# Patient Record
Sex: FEMALE | Race: WHITE | Employment: OTHER | URBAN - METROPOLITAN AREA
[De-identification: names, ages, dates, MRNs, and addresses within clinical notes are randomized per-mention and may not be internally consistent; named-entity substitution may affect disease eponyms.]

---

## 2018-07-07 ENCOUNTER — HOSPITAL ENCOUNTER (INPATIENT)
Age: 73
LOS: 5 days | Discharge: NURSING, REHAB OR LONG-TERM CARE | DRG: 291 | End: 2018-07-13
Attending: EMERGENCY MEDICINE | Admitting: INTERNAL MEDICINE
Payer: COMMERCIAL

## 2018-07-07 DIAGNOSIS — N17.9 ACUTE RENAL FAILURE, UNSPECIFIED ACUTE RENAL FAILURE TYPE (HCC): Primary | ICD-10-CM

## 2018-07-07 DIAGNOSIS — R53.1 GENERAL WEAKNESS: ICD-10-CM

## 2018-07-07 DIAGNOSIS — I50.20 SYSTOLIC CONGESTIVE HEART FAILURE, UNSPECIFIED CONGESTIVE HEART FAILURE CHRONICITY: ICD-10-CM

## 2018-07-07 DIAGNOSIS — R94.31 ABNORMAL EKG: ICD-10-CM

## 2018-07-07 PROCEDURE — 99285 EMERGENCY DEPT VISIT HI MDM: CPT

## 2018-07-08 ENCOUNTER — APPOINTMENT (OUTPATIENT)
Dept: ULTRASOUND IMAGING | Age: 73
DRG: 291 | End: 2018-07-08
Payer: COMMERCIAL

## 2018-07-08 ENCOUNTER — APPOINTMENT (OUTPATIENT)
Dept: CT IMAGING | Age: 73
DRG: 291 | End: 2018-07-08
Payer: COMMERCIAL

## 2018-07-08 ENCOUNTER — APPOINTMENT (OUTPATIENT)
Dept: GENERAL RADIOLOGY | Age: 73
DRG: 291 | End: 2018-07-08
Payer: COMMERCIAL

## 2018-07-08 PROBLEM — N17.9 ACUTE RENAL FAILURE (ARF) (HCC): Status: ACTIVE | Noted: 2018-07-08

## 2018-07-08 PROBLEM — R94.31 ABNORMAL EKG: Status: ACTIVE | Noted: 2018-07-08

## 2018-07-08 PROBLEM — I50.20 SYSTOLIC CONGESTIVE HEART FAILURE (HCC): Status: ACTIVE | Noted: 2018-07-08

## 2018-07-08 PROBLEM — R53.1 GENERAL WEAKNESS: Status: ACTIVE | Noted: 2018-07-08

## 2018-07-08 LAB
ALBUMIN SERPL-MCNC: 3 G/DL (ref 3.5–5.2)
ALP BLD-CCNC: 86 U/L (ref 35–104)
ALT SERPL-CCNC: 7 U/L (ref 0–32)
AMYLASE: 32 U/L (ref 20–100)
ANION GAP SERPL CALCULATED.3IONS-SCNC: 22 MMOL/L (ref 7–16)
ANION GAP SERPL CALCULATED.3IONS-SCNC: 26 MMOL/L (ref 7–16)
APTT: 30.3 SEC (ref 24.5–35.1)
AST SERPL-CCNC: 14 U/L (ref 0–31)
BACTERIA: ABNORMAL /HPF
BILIRUB SERPL-MCNC: 0.3 MG/DL (ref 0–1.2)
BILIRUBIN DIRECT: <0.2 MG/DL (ref 0–0.3)
BILIRUBIN URINE: NEGATIVE
BILIRUBIN, INDIRECT: ABNORMAL MG/DL (ref 0–1)
BLOOD, URINE: ABNORMAL
BUN BLDV-MCNC: 124 MG/DL (ref 8–23)
BUN BLDV-MCNC: 130 MG/DL (ref 8–23)
CALCIUM SERPL-MCNC: 7.5 MG/DL (ref 8.6–10.2)
CALCIUM SERPL-MCNC: 7.6 MG/DL (ref 8.6–10.2)
CHLORIDE BLD-SCNC: 100 MMOL/L (ref 98–107)
CHLORIDE BLD-SCNC: 99 MMOL/L (ref 98–107)
CHLORIDE URINE RANDOM: 48 MMOL/L
CK MB: 2.5 NG/ML (ref 0–4.3)
CLARITY: ABNORMAL
CO2: 15 MMOL/L (ref 22–29)
CO2: 17 MMOL/L (ref 22–29)
COLOR: ABNORMAL
CREAT SERPL-MCNC: 7.9 MG/DL (ref 0.5–1)
CREAT SERPL-MCNC: 8.2 MG/DL (ref 0.5–1)
CREATININE URINE: 161 MG/DL (ref 29–226)
D DIMER: 368 NG/ML DDU
EKG ATRIAL RATE: 66 BPM
EKG P AXIS: 52 DEGREES
EKG P-R INTERVAL: 164 MS
EKG Q-T INTERVAL: 388 MS
EKG QRS DURATION: 76 MS
EKG QTC CALCULATION (BAZETT): 406 MS
EKG R AXIS: 88 DEGREES
EKG T AXIS: 162 DEGREES
EKG VENTRICULAR RATE: 66 BPM
EOSINOPHIL, URINE: 0 % (ref 0–1)
EPITHELIAL CELLS, UA: ABNORMAL /HPF
GFR AFRICAN AMERICAN: 6
GFR AFRICAN AMERICAN: 6
GFR NON-AFRICAN AMERICAN: 5 ML/MIN/1.73
GFR NON-AFRICAN AMERICAN: 5 ML/MIN/1.73
GLUCOSE BLD-MCNC: 191 MG/DL (ref 74–109)
GLUCOSE BLD-MCNC: 194 MG/DL (ref 74–109)
GLUCOSE URINE: NEGATIVE MG/DL
HBA1C MFR BLD: 7.9 % (ref 4–5.6)
HCT VFR BLD CALC: 33 % (ref 34–48)
HEMOGLOBIN: 9.8 G/DL (ref 11.5–15.5)
INR BLD: 1.1
KETONES, URINE: NEGATIVE MG/DL
LACTIC ACID: 1.3 MMOL/L (ref 0.5–2.2)
LEUKOCYTE ESTERASE, URINE: ABNORMAL
LIPASE: 67 U/L (ref 13–60)
MAGNESIUM: 2.6 MG/DL (ref 1.6–2.6)
MCH RBC QN AUTO: 24.9 PG (ref 26–35)
MCHC RBC AUTO-ENTMCNC: 29.7 % (ref 32–34.5)
MCV RBC AUTO: 83.8 FL (ref 80–99.9)
METER GLUCOSE: 101 MG/DL (ref 70–110)
METER GLUCOSE: 146 MG/DL (ref 70–110)
METER GLUCOSE: 180 MG/DL (ref 70–110)
METER GLUCOSE: 65 MG/DL (ref 70–110)
NITRITE, URINE: NEGATIVE
PDW BLD-RTO: 17.5 FL (ref 11.5–15)
PH UA: 7.5 (ref 5–9)
PLATELET # BLD: 121 E9/L (ref 130–450)
PMV BLD AUTO: 13.1 FL (ref 7–12)
POTASSIUM SERPL-SCNC: 4 MMOL/L (ref 3.5–5)
POTASSIUM SERPL-SCNC: 4.6 MMOL/L (ref 3.5–5)
POTASSIUM, UR: 20 MMOL/L
PRO-BNP: ABNORMAL PG/ML (ref 0–450)
PROTEIN UA: 100 MG/DL
PROTHROMBIN TIME: 12.5 SEC (ref 9.3–12.4)
RBC # BLD: 3.94 E12/L (ref 3.5–5.5)
RBC UA: >20 /HPF (ref 0–2)
SODIUM BLD-SCNC: 139 MMOL/L (ref 132–146)
SODIUM BLD-SCNC: 140 MMOL/L (ref 132–146)
SODIUM URINE: 60 MMOL/L
SPECIFIC GRAVITY UA: 1.02 (ref 1–1.03)
T3 UPTAKE PERCENT: 36.7 % (ref 22.5–37)
T4 FREE: 0.79 NG/DL (ref 0.93–1.7)
TOTAL CK: 43 U/L (ref 20–180)
TOTAL PROTEIN: 6.6 G/DL (ref 6.4–8.3)
TROPONIN: 0.05 NG/ML (ref 0–0.03)
TSH SERPL DL<=0.05 MIU/L-ACNC: 6.2 UIU/ML (ref 0.27–4.2)
UROBILINOGEN, URINE: 0.2 E.U./DL
WBC # BLD: 6.2 E9/L (ref 4.5–11.5)
WBC UA: >20 /HPF (ref 0–5)

## 2018-07-08 PROCEDURE — 85378 FIBRIN DEGRADE SEMIQUANT: CPT

## 2018-07-08 PROCEDURE — 87186 SC STD MICRODIL/AGAR DIL: CPT

## 2018-07-08 PROCEDURE — 36415 COLL VENOUS BLD VENIPUNCTURE: CPT

## 2018-07-08 PROCEDURE — 93005 ELECTROCARDIOGRAM TRACING: CPT | Performed by: EMERGENCY MEDICINE

## 2018-07-08 PROCEDURE — 82436 ASSAY OF URINE CHLORIDE: CPT

## 2018-07-08 PROCEDURE — 87149 DNA/RNA DIRECT PROBE: CPT

## 2018-07-08 PROCEDURE — 82962 GLUCOSE BLOOD TEST: CPT

## 2018-07-08 PROCEDURE — 84443 ASSAY THYROID STIM HORMONE: CPT

## 2018-07-08 PROCEDURE — 83690 ASSAY OF LIPASE: CPT

## 2018-07-08 PROCEDURE — 84300 ASSAY OF URINE SODIUM: CPT

## 2018-07-08 PROCEDURE — 94640 AIRWAY INHALATION TREATMENT: CPT

## 2018-07-08 PROCEDURE — 72131 CT LUMBAR SPINE W/O DYE: CPT

## 2018-07-08 PROCEDURE — 70450 CT HEAD/BRAIN W/O DYE: CPT

## 2018-07-08 PROCEDURE — 83735 ASSAY OF MAGNESIUM: CPT

## 2018-07-08 PROCEDURE — 83605 ASSAY OF LACTIC ACID: CPT

## 2018-07-08 PROCEDURE — 82550 ASSAY OF CK (CPK): CPT

## 2018-07-08 PROCEDURE — 84439 ASSAY OF FREE THYROXINE: CPT

## 2018-07-08 PROCEDURE — 6370000000 HC RX 637 (ALT 250 FOR IP): Performed by: INTERNAL MEDICINE

## 2018-07-08 PROCEDURE — 84133 ASSAY OF URINE POTASSIUM: CPT

## 2018-07-08 PROCEDURE — 87205 SMEAR GRAM STAIN: CPT

## 2018-07-08 PROCEDURE — 82150 ASSAY OF AMYLASE: CPT

## 2018-07-08 PROCEDURE — 82553 CREATINE MB FRACTION: CPT

## 2018-07-08 PROCEDURE — 76775 US EXAM ABDO BACK WALL LIM: CPT

## 2018-07-08 PROCEDURE — 85730 THROMBOPLASTIN TIME PARTIAL: CPT

## 2018-07-08 PROCEDURE — 1200000000 HC SEMI PRIVATE

## 2018-07-08 PROCEDURE — 81001 URINALYSIS AUTO W/SCOPE: CPT

## 2018-07-08 PROCEDURE — 71045 X-RAY EXAM CHEST 1 VIEW: CPT

## 2018-07-08 PROCEDURE — 80048 BASIC METABOLIC PNL TOTAL CA: CPT

## 2018-07-08 PROCEDURE — 80076 HEPATIC FUNCTION PANEL: CPT

## 2018-07-08 PROCEDURE — 83036 HEMOGLOBIN GLYCOSYLATED A1C: CPT

## 2018-07-08 PROCEDURE — 84484 ASSAY OF TROPONIN QUANT: CPT

## 2018-07-08 PROCEDURE — 2500000003 HC RX 250 WO HCPCS: Performed by: INTERNAL MEDICINE

## 2018-07-08 PROCEDURE — 87040 BLOOD CULTURE FOR BACTERIA: CPT

## 2018-07-08 PROCEDURE — 87077 CULTURE AEROBIC IDENTIFY: CPT

## 2018-07-08 PROCEDURE — 6360000002 HC RX W HCPCS: Performed by: INTERNAL MEDICINE

## 2018-07-08 PROCEDURE — 2580000003 HC RX 258: Performed by: INTERNAL MEDICINE

## 2018-07-08 PROCEDURE — 87088 URINE BACTERIA CULTURE: CPT

## 2018-07-08 PROCEDURE — 85027 COMPLETE CBC AUTOMATED: CPT

## 2018-07-08 PROCEDURE — 83880 ASSAY OF NATRIURETIC PEPTIDE: CPT

## 2018-07-08 PROCEDURE — 85610 PROTHROMBIN TIME: CPT

## 2018-07-08 PROCEDURE — 82570 ASSAY OF URINE CREATININE: CPT

## 2018-07-08 PROCEDURE — 72125 CT NECK SPINE W/O DYE: CPT

## 2018-07-08 RX ORDER — NICOTINE POLACRILEX 4 MG
15 LOZENGE BUCCAL PRN
Status: DISCONTINUED | OUTPATIENT
Start: 2018-07-08 | End: 2018-07-13 | Stop reason: HOSPADM

## 2018-07-08 RX ORDER — INSULIN GLARGINE 100 [IU]/ML
25 INJECTION, SOLUTION SUBCUTANEOUS NIGHTLY
Status: DISCONTINUED | OUTPATIENT
Start: 2018-07-08 | End: 2018-07-13 | Stop reason: HOSPADM

## 2018-07-08 RX ORDER — CARVEDILOL 6.25 MG/1
12.5 TABLET ORAL 2 TIMES DAILY WITH MEALS
Status: DISCONTINUED | OUTPATIENT
Start: 2018-07-08 | End: 2018-07-09 | Stop reason: DRUGHIGH

## 2018-07-08 RX ORDER — HYDRALAZINE HYDROCHLORIDE 25 MG/1
25 TABLET, FILM COATED ORAL EVERY 8 HOURS SCHEDULED
Status: DISCONTINUED | OUTPATIENT
Start: 2018-07-08 | End: 2018-07-09

## 2018-07-08 RX ORDER — CALCITRIOL 0.25 UG/1
0.25 CAPSULE, LIQUID FILLED ORAL
Status: DISCONTINUED | OUTPATIENT
Start: 2018-07-08 | End: 2018-07-13 | Stop reason: HOSPADM

## 2018-07-08 RX ORDER — ASPIRIN 81 MG/1
81 TABLET, CHEWABLE ORAL DAILY
Status: DISCONTINUED | OUTPATIENT
Start: 2018-07-08 | End: 2018-07-13 | Stop reason: HOSPADM

## 2018-07-08 RX ORDER — BUMETANIDE 0.25 MG/ML
1 INJECTION, SOLUTION INTRAMUSCULAR; INTRAVENOUS 2 TIMES DAILY
Status: DISCONTINUED | OUTPATIENT
Start: 2018-07-08 | End: 2018-07-09 | Stop reason: DRUGHIGH

## 2018-07-08 RX ORDER — HEPARIN SODIUM 5000 [USP'U]/ML
5000 INJECTION, SOLUTION INTRAVENOUS; SUBCUTANEOUS EVERY 8 HOURS SCHEDULED
Status: DISCONTINUED | OUTPATIENT
Start: 2018-07-08 | End: 2018-07-11

## 2018-07-08 RX ORDER — SODIUM CHLORIDE 0.9 % (FLUSH) 0.9 %
10 SYRINGE (ML) INJECTION EVERY 12 HOURS SCHEDULED
Status: DISCONTINUED | OUTPATIENT
Start: 2018-07-08 | End: 2018-07-13 | Stop reason: HOSPADM

## 2018-07-08 RX ORDER — ACETAMINOPHEN 325 MG/1
650 TABLET ORAL EVERY 4 HOURS PRN
Status: DISCONTINUED | OUTPATIENT
Start: 2018-07-08 | End: 2018-07-13 | Stop reason: HOSPADM

## 2018-07-08 RX ORDER — IPRATROPIUM BROMIDE AND ALBUTEROL SULFATE 2.5; .5 MG/3ML; MG/3ML
1 SOLUTION RESPIRATORY (INHALATION)
Status: DISCONTINUED | OUTPATIENT
Start: 2018-07-08 | End: 2018-07-13 | Stop reason: HOSPADM

## 2018-07-08 RX ORDER — ONDANSETRON 2 MG/ML
4 INJECTION INTRAMUSCULAR; INTRAVENOUS EVERY 6 HOURS PRN
Status: DISCONTINUED | OUTPATIENT
Start: 2018-07-08 | End: 2018-07-13 | Stop reason: HOSPADM

## 2018-07-08 RX ORDER — SODIUM CHLORIDE 0.9 % (FLUSH) 0.9 %
10 SYRINGE (ML) INJECTION PRN
Status: DISCONTINUED | OUTPATIENT
Start: 2018-07-08 | End: 2018-07-13 | Stop reason: HOSPADM

## 2018-07-08 RX ORDER — DEXTROSE MONOHYDRATE 50 MG/ML
100 INJECTION, SOLUTION INTRAVENOUS PRN
Status: DISCONTINUED | OUTPATIENT
Start: 2018-07-08 | End: 2018-07-13 | Stop reason: HOSPADM

## 2018-07-08 RX ORDER — LEVOTHYROXINE SODIUM 0.1 MG/1
100 TABLET ORAL DAILY
Status: DISCONTINUED | OUTPATIENT
Start: 2018-07-08 | End: 2018-07-13 | Stop reason: HOSPADM

## 2018-07-08 RX ORDER — DEXTROSE MONOHYDRATE 25 G/50ML
12.5 INJECTION, SOLUTION INTRAVENOUS PRN
Status: DISCONTINUED | OUTPATIENT
Start: 2018-07-08 | End: 2018-07-13 | Stop reason: HOSPADM

## 2018-07-08 RX ADMIN — Medication 10 ML: at 21:00

## 2018-07-08 RX ADMIN — BUMETANIDE 1 MG: 0.25 INJECTION INTRAMUSCULAR; INTRAVENOUS at 09:55

## 2018-07-08 RX ADMIN — HEPARIN SODIUM 5000 UNITS: 5000 INJECTION, SOLUTION INTRAVENOUS; SUBCUTANEOUS at 15:10

## 2018-07-08 RX ADMIN — Medication 10 ML: at 09:56

## 2018-07-08 RX ADMIN — INSULIN LISPRO 1 UNITS: 100 INJECTION, SOLUTION INTRAVENOUS; SUBCUTANEOUS at 10:04

## 2018-07-08 RX ADMIN — ASPIRIN 81 MG 81 MG: 81 TABLET ORAL at 09:54

## 2018-07-08 RX ADMIN — CEFTRIAXONE 1 G: 1 INJECTION, POWDER, FOR SOLUTION INTRAMUSCULAR; INTRAVENOUS at 07:53

## 2018-07-08 RX ADMIN — IPRATROPIUM BROMIDE AND ALBUTEROL SULFATE 1 AMPULE: .5; 3 SOLUTION RESPIRATORY (INHALATION) at 10:46

## 2018-07-08 RX ADMIN — HEPARIN SODIUM 5000 UNITS: 5000 INJECTION, SOLUTION INTRAVENOUS; SUBCUTANEOUS at 23:05

## 2018-07-08 RX ADMIN — INSULIN LISPRO 15 UNITS: 100 INJECTION, SOLUTION INTRAVENOUS; SUBCUTANEOUS at 10:02

## 2018-07-08 RX ADMIN — HEPARIN SODIUM 5000 UNITS: 5000 INJECTION, SOLUTION INTRAVENOUS; SUBCUTANEOUS at 07:50

## 2018-07-08 RX ADMIN — CARVEDILOL 12.5 MG: 6.25 TABLET, FILM COATED ORAL at 09:54

## 2018-07-08 RX ADMIN — IPRATROPIUM BROMIDE AND ALBUTEROL SULFATE 1 AMPULE: .5; 3 SOLUTION RESPIRATORY (INHALATION) at 13:51

## 2018-07-08 RX ADMIN — CALCITRIOL 0.25 MCG: 0.25 CAPSULE, LIQUID FILLED ORAL at 09:56

## 2018-07-08 RX ADMIN — LEVOTHYROXINE SODIUM 100 MCG: 100 TABLET ORAL at 07:51

## 2018-07-08 ASSESSMENT — ENCOUNTER SYMPTOMS
WHEEZING: 0
SORE THROAT: 0
DIARRHEA: 0
SINUS PRESSURE: 0
ABDOMINAL PAIN: 0
EYE DISCHARGE: 0
SHORTNESS OF BREATH: 0
COUGH: 0
HEMATOCHEZIA: 0
VISUAL CHANGE: 0
ABDOMINAL DISTENTION: 0
VOMITING: 0
EYE REDNESS: 0
NAUSEA: 0
BACK PAIN: 0
EYE PAIN: 0

## 2018-07-08 NOTE — ED PROVIDER NOTES
The history is provided by the patient. No  was used. Fatigue   Timing:  Constant  Progression:  Worsening  Chronicity:  New  Context: not alcohol use, not allergies, not change in medication, not decreased sleep, not dehydration, not drug use, not increased activity, not pinched nerve, not recent infection, not stress and not urinary tract infection    Relieved by:  Nothing  Worsened by:  Nothing  Ineffective treatments:  None tried  Associated symptoms: difficulty walking and lethargy    Associated symptoms: no abdominal pain, no anorexia, no aphasia, no arthralgias, no ataxia, no chest pain, no cough, no diarrhea, no dizziness, no drooling, no dysphagia, no dysuria, no fever, no foul-smelling urine, no frequency, no headaches, no hematochezia, no loss of consciousness, no melena, no nausea, no seizures, no sensory-motor deficit, no shortness of breath, no stroke symptoms, no syncope, no urgency, no vision change and no vomiting    Risk factors: no anemia        Review of Systems   Constitutional: Positive for fatigue. Negative for chills and fever. HENT: Negative for drooling, ear pain, sinus pressure and sore throat. Eyes: Negative for pain, discharge and redness. Respiratory: Negative for cough, shortness of breath and wheezing. Cardiovascular: Negative for chest pain and syncope. Gastrointestinal: Negative for abdominal distention, abdominal pain, anorexia, diarrhea, dysphagia, hematochezia, melena, nausea and vomiting. Genitourinary: Negative for dysuria, frequency and urgency. Musculoskeletal: Negative for arthralgias and back pain. Skin: Negative for rash and wound. Neurological: Negative for dizziness, seizures, loss of consciousness, weakness and headaches. Hematological: Negative for adenopathy. All other systems reviewed and are negative. Physical Exam   Constitutional: She is oriented to person, place, and time.  She appears well-developed and (2011); ECHO Compl W Dop Color Flow (3/28/2012 EF 42%); and Dialysis fistula creation (april 2012). Social History:  reports that she quit smoking about 26 years ago. She has a 60.00 pack-year smoking history. She has never used smokeless tobacco. She reports that she does not drink alcohol or use drugs. Family History: family history includes Arthritis in her mother; Cancer in her mother; Diabetes in her mother and sister; Zeina Seton / [place] in her mother. The patients home medications have been reviewed.     Allergies: Ace inhibitors and Angiotensin receptor blockers    -------------------------------------------------- RESULTS -------------------------------------------------    LABS:  Results for orders placed or performed during the hospital encounter of 68/82/69   Basic metabolic panel   Result Value Ref Range    Sodium 140 132 - 146 mmol/L    Potassium 4.6 3.5 - 5.0 mmol/L    Chloride 99 98 - 107 mmol/L    CO2 15 (L) 22 - 29 mmol/L    Anion Gap 26 (H) 7 - 16 mmol/L    Glucose 194 (H) 74 - 109 mg/dL     (H) 8 - 23 mg/dL    CREATININE 7.9 (H) 0.5 - 1.0 mg/dL    GFR Non-African American 5 >=60 mL/min/1.73    GFR African American 6     Calcium 7.6 (L) 8.6 - 10.2 mg/dL   CBC   Result Value Ref Range    WBC 6.2 4.5 - 11.5 E9/L    RBC 3.94 3.50 - 5.50 E12/L    Hemoglobin 9.8 (L) 11.5 - 15.5 g/dL    Hematocrit 33.0 (L) 34.0 - 48.0 %    MCV 83.8 80.0 - 99.9 fL    MCH 24.9 (L) 26.0 - 35.0 pg    MCHC 29.7 (L) 32.0 - 34.5 %    RDW 17.5 (H) 11.5 - 15.0 fL    Platelets 718 (L) 193 - 450 E9/L    MPV 13.1 (H) 7.0 - 12.0 fL   Troponin   Result Value Ref Range    Troponin 0.05 (H) 0.00 - 0.03 ng/mL   CK   Result Value Ref Range    Total CK 43 20 - 180 U/L   CK MB   Result Value Ref Range    CK-MB 2.5 0.0 - 4.3 ng/mL   Brain Natriuretic Peptide   Result Value Ref Range    Pro-BNP >70,000 (H) 0 - 450 pg/mL   Amylase   Result Value Ref Range    Amylase 32 20 - 100 U/L   Lipase   Result Value Ref Range

## 2018-07-08 NOTE — H&P
CARDIAC CATHETERIZATION  03/08/2012    Cath, Dr. Bishop Cisneros, No Intervention    CHOLECYSTECTOMY      DIALYSIS FISTULA CREATION  april 2012    creation right arm a/v fistula    ECHO COMPL W DOP COLOR FLOW  3/3/2012 EF 55%         ECHO COMPL W DOP COLOR FLOW  3/28/2012 EF 42%         ECHO LIMITED/FOLLOW UP  3/9/2012         FRACTURE SURGERY      SHOULDER ARTHROPLASTY  2011       FAMILY Hx:  Family History   Problem Relation Age of Onset    Arthritis Mother     Cancer Mother     Diabetes Mother    [de-identified] / Stillbirths Mother     Diabetes Sister        HOME MEDICATIONS:  Prior to Admission medications    Medication Sig Start Date End Date Taking? Authorizing Provider   aspirin 81 MG tablet Take 81 mg by mouth daily. Yes Historical Provider, MD   bumetanide (BUMEX) 2 MG tablet Take 2 mg by mouth daily (with breakfast). Yes Historical Provider, MD   carvedilol (COREG) 25 MG tablet Take 12.5 mg by mouth 2 times daily (with meals). pcp Dr Joya Hong will call doses/names of meds to [facility] branch" delivery system 5/8/14  Yes Historical Provider, MD   calcitRIOL (ROCALTROL) 0.25 MCG capsule Take 1 capsule by mouth four times a week. 8/23/12  Yes Willard Hwang,    levothyroxine (SYNTHROID) 100 MCG tablet Take 1 tablet by mouth Daily. 8/22/12  Yes Willard Hwang DO   hydrALAZINE (APRESOLINE) 25 MG tablet Take 1 tablet by mouth every 8 hours. 3/31/12  Yes Dillon Valenzuela,    insulin aspart (NOVOLOG) 100 UNIT/ML injection Inject 15 Units into the skin 3 times daily (before meals). Yes Historical Provider, MD   insulin glargine (LANTUS) 100 UNIT/ML injection Inject 25 Units into the skin nightly. Yes Historical Provider, MD   vitamin D (ERGOCALCIFEROL) 54950 UNITS CAPS capsule Take 50,000 Units by mouth once a week. Taken on Wednesdays. Historical Provider, MD   sevelamer (RENVELA) 800 MG tablet Take 2 tablets by mouth 3 times daily (with meals).  8/22/12   Willard Hwang DO Tonsils without erythema or exudates. NECK:    Supple, symmetrical, trachea midline, no adenopathy, thyroid symmetric, not enlarged and no tenderness, skin normal, no bruits, no JVD    HEMATOLOGIC/LYMPHATICS:    No cervical lymphadenopathy and no supraclavicular lymphadenopathy    LUNGS:    Symmetric. No increased work of breathing, good air exchange, clear to auscultation bilaterally, no wheezes, rhonchi, or rales,     CARDIOVASCULAR:    Normal apical impulse, regular rate and rhythm, normal S1 and S2, no S3 or S4, and no murmur noted    ABDOMEN:    No scars, normal bowel sounds, soft, non-distended, non-tender, no masses palpated, no hepatosplenomegaly, no rebound or guarding elicited on palpation     MUSCULOSKELETAL:    There is no redness, warmth, or swelling of the joints. Full range of motion noted. Motor strength is 5 out of 5 all extremities bilaterally. Tone is normal.    NEUROLOGIC:    Awake, alert, oriented to name, place and time. Cranial nerves II-XII are grossly intact. Motor is 5 out of 5 bilaterally. SKIN:    No bruising or bleeding. No redness, warmth, or swelling    EXTREMITIES:    Bilateral lower extremity swelling and edema. OSTEOPATHIC:    Examined in seated and supine positions. Normal thoracic kyphosis and lumbar lordosis. No acute somatic dysfunction.     LABORATORY DATA:  CBC with Differential:    Lab Results   Component Value Date    WBC 6.2 07/08/2018    RBC 3.94 07/08/2018    HGB 9.8 07/08/2018    HCT 33.0 07/08/2018     07/08/2018    MCV 83.8 07/08/2018    MCH 24.9 07/08/2018    MCHC 29.7 07/08/2018    RDW 17.5 07/08/2018    SEGSPCT 58 03/06/2014    LYMPHOPCT 19 04/25/2014    MONOPCT 10 04/25/2014    BASOPCT 0 04/25/2014    MONOSABS 0.95 04/25/2014    LYMPHSABS 1.84 04/25/2014    EOSABS 0.50 04/25/2014    BASOSABS 0.03 04/25/2014     BMP:    Lab Results   Component Value Date     07/08/2018    K 4.6 07/08/2018    CL 99 07/08/2018    CO2 15 07/08/2018    BUN 130 07/08/2018    LABALBU 3.0 07/08/2018    LABALBU 3.6 05/09/2012    CREATININE 7.9 07/08/2018    CALCIUM 7.6 07/08/2018    GFRAA 6 07/08/2018    LABGLOM 5 07/08/2018    GLUCOSE 194 07/08/2018    GLUCOSE 122 06/04/2012       ASSESSMENT:  1. Acutely decompensated systolic congestive heart failure with marked volume overload  2. Acute on chronic kidney disease stage III with baseline creatinine of approximately 3.0  3. Generalized deconditioning with inability to complete activities of daily living  4. Essential hypertension  5. Hyperlipidemia  6. Hypothyroidism  7. Coronary artery disease    PLAN:  The patient has obviously become very weak and deconditioned. IV diuresis will be undertaken and we will monitor strict I's and O's as well as daily weights. The patient follows with Dr. Juanis Marie as an outpatient and he will be asked to provide consultation. We discussed the need for fluid restriction as an outpatient. The patient's creatinine is markedly deviated from her baseline as well. She follows with Dr. Bobby Bean longitudinally and he will be asked to provide consultation. We will assess the patient's response to diuresis. Chronic comorbidities will be monitored accordingly. She requires aggressive work with the therapy teams. Family was updated at the bedside. Care will return to Dr. Judith Dozier tomorrow.     Jodee Cm D.O., FACOI  12:11 PM  7/8/2018    Electronically signed by Jodee Cm DO on 7/8/18 at 12:11 PM

## 2018-07-08 NOTE — CONSULTS
smokeless tobacco. She reports that she does not drink alcohol or use drugs. Family History:  family history includes Arthritis in her mother; Cancer in her mother; Diabetes in her mother and sister; Jarett Webster / [place] in her mother. Medications:  Prior to Admission medications    Medication Sig Start Date End Date Taking? Authorizing Provider   aspirin 81 MG tablet Take 81 mg by mouth daily. Yes Historical Provider, MD   bumetanide (BUMEX) 2 MG tablet Take 2 mg by mouth daily (with breakfast). Yes Historical Provider, MD   carvedilol (COREG) 25 MG tablet Take 12.5 mg by mouth 2 times daily (with meals). pcp Dr Darren Tanner will call doses/names of meds to [facility] for [facility] branch" delivery system 5/8/14  Yes Historical Provider, MD   calcitRIOL (ROCALTROL) 0.25 MCG capsule Take 1 capsule by mouth four times a week. 8/23/12  Yes Lenka Foley DO   levothyroxine (SYNTHROID) 100 MCG tablet Take 1 tablet by mouth Daily. 8/22/12  Yes Lenka Foley DO   hydrALAZINE (APRESOLINE) 25 MG tablet Take 1 tablet by mouth every 8 hours. 3/31/12  Yes Dillon Valenzuela,    insulin aspart (NOVOLOG) 100 UNIT/ML injection Inject 15 Units into the skin 3 times daily (before meals). Yes Historical Provider, MD   insulin glargine (LANTUS) 100 UNIT/ML injection Inject 25 Units into the skin nightly. Yes Historical Provider, MD   vitamin D (ERGOCALCIFEROL) 04077 UNITS CAPS capsule Take 50,000 Units by mouth once a week. Taken on Wednesdays. Historical Provider, MD   sevelamer (RENVELA) 800 MG tablet Take 2 tablets by mouth 3 times daily (with meals). 8/22/12   Lenka Foley DO       Allergies:  Ace inhibitors and Angiotensin receptor blockers     Review of Systems:   · Constitutional: there has been no unanticipated weight loss. There's been no significant change in energy level, sleep pattern or activity level. No fever chills or rigors. · Eyes: No visual changes or diplopia.  No scleral icterus. · ENT: No Headaches, hearing loss or vertigo. No mouth sores or sore throat. No change in taste or smell. · Cardiovascular: No chest discomfort, but has dyspnea on exertion, denies palpitations, loss of consciousness, no phlebitis, no claudication. · Respiratory: No cough or wheezing, no sputum production. No hemoptysis, pleuritic pain. · Gastrointestinal: No abdominal pain, appetite loss, blood in stools. No change in bowel habits. No hematemesis  · Genitourinary: No dysuria, trouble voiding or hematuria. No nocturia or increased frequency. · Musculoskeletal:  Has gait disturbance, weakness but no specific joint complaints. · Integumentary: No rash or pruritis. · Neurological: No headache, diplopia, change in muscle strength, numbness or tingling. Has change in gait, balance, coordination, but no change in mood, affect, memory, mentation, behavior. · Psychiatric: No anxiety or depression. · Endocrine: No temperature intolerance. No excessive thirst, fluid intake, or urination. No tremor. · Hematologic/Lymphatic: No abnormal bruising or bleeding, blood clots or swollen lymph nodes. · Allergic/Immunologic: No nasal congestion or hives. Physical Examination:    Vital Signs: BP (!) 86/50   Pulse 56   Temp 97.4 °F (36.3 °C) (Oral)   Resp 16   Ht 5' 5\" (1.651 m)   Wt 271 lb (122.9 kg)   SpO2 96%   BMI 45.10 kg/m²   General appearance: Well preserved, mesomorphic body habitus, alert, no distress. Skin: Skin color, texture, turgor normal. No rashes or lesions. No induration or tightening palpated. Head: Normocephalic. No masses, lesions, tenderness or abnormalities  Eyes: Conjunctivae/corneas clear. PERRL, EOMs intact. Sclera non icteric. Ears: External ears normal. Canals clear. TM's clear bilaterally. Hearing normal to finger rub. Nose/Sinuses: Nares normal. Septum midline. Mucosa normal. No drainage or sinus tenderness.   Oropharynx: Lips, mucosa, and tongue normal. Oropharynx clear with no exudate seen. Neck: Neck supple and symmetric. No adenopathy. Thyroid symmetric, normal size, without nodules. Trachea is midline. Carotids brisk in upstroke without bruits, no abnormal JVP noted at 45°. Chest: Even excursion  Lungs: Lungs clear to auscultation bilaterally. No retractions or use of accessory muscles. No tactile vocal fremitus. No rhonchi, crackles or rales. Heart:  S1 > S2. Regular rhythm. No gallop or murmur. No rub, palpable thrill or heave noted. PMI 5th intercostal space midclavicular line. Abdomen: Abdomen soft, moderately protuberant, non-tender. BS normal. No masses, organomegaly. No hernia noted. Extremities: Extremities normal. No deformities, 2+ bilateral ankle and pretibial edema, or skin discoloration. No cyanosis or clubbing noted to the nails. Peripheral pulses present 2+ upper extremities and barely palpable  lower extremities. Musculoskeletal: Spine ROM normal. Muscular strength intact. Neuro: Cranial nerves intact. Motor: Strength 5/5 in all extremities. Reflexes 2+ in all extremities. No focal weakness. Sensory: grossly normal to touch. Coordination intact.     Pertinent Labs:  CBC:   Recent Labs      07/08/18   0015   WBC  6.2   HGB  9.8*   PLT  121*     BMP:  Recent Labs      07/08/18   0015   NA  140   K  4.6   CL  99   CO2  15*   BUN  130*   CREATININE  7.9*   GLUCOSE  194*   LABGLOM  5     ABGs:   Lab Results   Component Value Date    PO2 138.0 03/02/2012    PCO2 34.8 03/02/2012     INR:   Recent Labs      07/08/18   0118   INR  1.1     PRO-BNP:   Lab Results   Component Value Date    PROBNP >70,000 (H) 07/08/2018      Cardiac Injury Profile:   Recent Labs      07/08/18   0015   CKTOTAL  43   CKMB  2.5   TROPONINI  0.05*      Lipid Profile:   Lab Results   Component Value Date    TRIG 246 04/22/2014    HDL 29 04/22/2014    LDLCALC 55 04/22/2014    CHOL 133 04/22/2014      Hemoglobin A1C: No components found for: HGBA1C   ECG:  See report    Radiology:  Emelia Gallego Wo Contrast    Result Date: 7/8/2018  Location:100 Exam: CT HEAD WO CONTRAST Comparison: March 16, 2009 History:  Trauma from fall, pain Findings: A noncontrast spiral CT of the brain was performed. Coronal and sagittal reconstructions. Automated dose exposure control was utilized for this examination. Prominent ventricles and sulci with periventricular leukomalacia. No evidence for acute hemorrhage or infarction. No mass-effect midline shift or herniation. Age-appropriate atrophy, no acute infarct or hemorrhage. Ct Cervical Spine Wo Contrast    Result Date: 7/8/2018  Location:100 Exam: CT CERVICAL SPINE WO CONTRAST Comparison: None History:  Trauma from fall, pain Findings: Spiral CT of cervical spine without contrast. Coronal and sagittal reconstructions were obtained. No soft tissue swelling or hematoma identified. Cervical vertebrae maintain normal stature and alignment. No evidence of fracture or subluxation. There is no spinal or foraminal stenosis. Facets are normal alignment. C1-C2 articulation is normal. Spondylosis with severe spurring sclerosis C5-6. Severe facet arthropathy right C5-6 C6-7.     1. No fracture or subluxation. 2. Severe spondylosis and facet arthropathy C5-6. Facet arthropathy C6-7. Ct Lumbar Spine Wo Contrast    Result Date: 7/8/2018  Reading location: 100 Exam: CT LUMBAR SPINE WO CONTRAST Comparison: None History: Injury from fall, pain TECHNIQUE: Spiral CT of the lumbar spine. Coronal and sagittal reconstructions were obtained. Automated dose exposure control was utilized for this examination. Findings: The lumbar vertebral bodies maintain normal stature and alignment. No evidence of fracture or subluxation. No lytic or blastic destructive changes. At L4-5 there is vacuum disc phenomenon with bulging disc which along with facet ligamentum flavum hypertrophy resulting in mild to moderate spinal stenosis.  There is mild spinal stenosis at L3-4 as well secondary to the facet Grover Tesfaye, [facility]    NOTE:  This report was transcribed using voice recognition software. Every effort was made to ensure accuracy; however, inadvertent computerized transcription errors may be present.

## 2018-07-09 ENCOUNTER — APPOINTMENT (OUTPATIENT)
Dept: INTERVENTIONAL RADIOLOGY/VASCULAR | Age: 73
DRG: 291 | End: 2018-07-09
Payer: COMMERCIAL

## 2018-07-09 LAB
ALBUMIN SERPL-MCNC: 3 G/DL (ref 3.5–5.2)
ALP BLD-CCNC: 77 U/L (ref 35–104)
ALT SERPL-CCNC: 8 U/L (ref 0–32)
ANION GAP SERPL CALCULATED.3IONS-SCNC: 26 MMOL/L (ref 7–16)
APTT: 31.9 SEC (ref 24.5–35.1)
AST SERPL-CCNC: 20 U/L (ref 0–31)
BASOPHILS ABSOLUTE: 0 E9/L (ref 0–0.2)
BASOPHILS RELATIVE PERCENT: 0 % (ref 0–2)
BILIRUB SERPL-MCNC: 0.3 MG/DL (ref 0–1.2)
BUN BLDV-MCNC: 134 MG/DL (ref 8–23)
CALCIUM SERPL-MCNC: 8.1 MG/DL (ref 8.6–10.2)
CHLORIDE BLD-SCNC: 98 MMOL/L (ref 98–107)
CHOLESTEROL, TOTAL: 83 MG/DL (ref 0–199)
CO2: 15 MMOL/L (ref 22–29)
CREAT SERPL-MCNC: 8.3 MG/DL (ref 0.5–1)
CREATININE URINE: 199 MG/DL (ref 29–226)
EOSINOPHILS ABSOLUTE: 0.09 E9/L (ref 0.05–0.5)
EOSINOPHILS RELATIVE PERCENT: 2 % (ref 0–6)
GFR AFRICAN AMERICAN: 6
GFR NON-AFRICAN AMERICAN: 5 ML/MIN/1.73
GLUCOSE BLD-MCNC: 90 MG/DL (ref 74–109)
HCT VFR BLD CALC: 30.7 % (ref 34–48)
HCT VFR BLD CALC: 32.4 % (ref 34–48)
HDLC SERPL-MCNC: 42 MG/DL
HEMOGLOBIN: 8.8 G/DL (ref 11.5–15.5)
HEMOGLOBIN: 9.4 G/DL (ref 11.5–15.5)
INR BLD: 1.2
LDL CHOLESTEROL CALCULATED: 16 MG/DL (ref 0–99)
LV EF: 32 %
LVEF MODALITY: NORMAL
LYMPHOCYTES ABSOLUTE: 0.77 E9/L (ref 1.5–4)
LYMPHOCYTES RELATIVE PERCENT: 18 % (ref 20–42)
MAGNESIUM: 2.8 MG/DL (ref 1.6–2.6)
MCH RBC QN AUTO: 24.2 PG (ref 26–35)
MCH RBC QN AUTO: 24.4 PG (ref 26–35)
MCHC RBC AUTO-ENTMCNC: 28.7 % (ref 32–34.5)
MCHC RBC AUTO-ENTMCNC: 29 % (ref 32–34.5)
MCV RBC AUTO: 83.9 FL (ref 80–99.9)
MCV RBC AUTO: 84.6 FL (ref 80–99.9)
METER GLUCOSE: 104 MG/DL (ref 70–110)
METER GLUCOSE: 106 MG/DL (ref 70–110)
METER GLUCOSE: 116 MG/DL (ref 70–110)
METER GLUCOSE: 99 MG/DL (ref 70–110)
MONOCYTES ABSOLUTE: 0.26 E9/L (ref 0.1–0.95)
MONOCYTES RELATIVE PERCENT: 6 % (ref 2–12)
MYELOCYTE PERCENT: 1 % (ref 0–0)
NEUTROPHILS ABSOLUTE: 3.18 E9/L (ref 1.8–7.3)
NEUTROPHILS RELATIVE PERCENT: 73 % (ref 43–80)
NUCLEATED RED BLOOD CELLS: 1 /100 WBC
PDW BLD-RTO: 17.5 FL (ref 11.5–15)
PDW BLD-RTO: 17.6 FL (ref 11.5–15)
PHOSPHORUS: 8.6 MG/DL (ref 2.5–4.5)
PLATELET # BLD: 123 E9/L (ref 130–450)
PLATELET # BLD: 53 E9/L (ref 130–450)
PLATELET CONFIRMATION: NORMAL
PMV BLD AUTO: 12.3 FL (ref 7–12)
PMV BLD AUTO: ABNORMAL FL (ref 7–12)
POTASSIUM SERPL-SCNC: 5.4 MMOL/L (ref 3.5–5)
PROTEIN PROTEIN: 234 MG/DL (ref 0–12)
PROTHROMBIN TIME: 13.5 SEC (ref 9.3–12.4)
RBC # BLD: 3.63 E12/L (ref 3.5–5.5)
RBC # BLD: 3.86 E12/L (ref 3.5–5.5)
SODIUM BLD-SCNC: 139 MMOL/L (ref 132–146)
SODIUM URINE: 22 MMOL/L
TOTAL PROTEIN: 6.5 G/DL (ref 6.4–8.3)
TRIGL SERPL-MCNC: 126 MG/DL (ref 0–149)
TSH SERPL DL<=0.05 MIU/L-ACNC: 4.75 UIU/ML (ref 0.27–4.2)
VLDLC SERPL CALC-MCNC: 25 MG/DL
WBC # BLD: 4.3 E9/L (ref 4.5–11.5)
WBC # BLD: 5 E9/L (ref 4.5–11.5)

## 2018-07-09 PROCEDURE — 85025 COMPLETE CBC W/AUTO DIFF WBC: CPT

## 2018-07-09 PROCEDURE — 82962 GLUCOSE BLOOD TEST: CPT

## 2018-07-09 PROCEDURE — G8978 MOBILITY CURRENT STATUS: HCPCS | Performed by: PHYSICAL THERAPIST

## 2018-07-09 PROCEDURE — C1881 DIALYSIS ACCESS SYSTEM: HCPCS

## 2018-07-09 PROCEDURE — 84443 ASSAY THYROID STIM HORMONE: CPT

## 2018-07-09 PROCEDURE — 84300 ASSAY OF URINE SODIUM: CPT

## 2018-07-09 PROCEDURE — P9046 ALBUMIN (HUMAN), 25%, 20 ML: HCPCS

## 2018-07-09 PROCEDURE — 1200000000 HC SEMI PRIVATE

## 2018-07-09 PROCEDURE — 86706 HEP B SURFACE ANTIBODY: CPT

## 2018-07-09 PROCEDURE — 93306 TTE W/DOPPLER COMPLETE: CPT

## 2018-07-09 PROCEDURE — 85027 COMPLETE CBC AUTOMATED: CPT

## 2018-07-09 PROCEDURE — 84100 ASSAY OF PHOSPHORUS: CPT

## 2018-07-09 PROCEDURE — 6370000000 HC RX 637 (ALT 250 FOR IP): Performed by: NURSE PRACTITIONER

## 2018-07-09 PROCEDURE — 2580000003 HC RX 258

## 2018-07-09 PROCEDURE — 36558 INSERT TUNNELED CV CATH: CPT | Performed by: RADIOLOGY

## 2018-07-09 PROCEDURE — 97161 PT EVAL LOW COMPLEX 20 MIN: CPT | Performed by: PHYSICAL THERAPIST

## 2018-07-09 PROCEDURE — 97530 THERAPEUTIC ACTIVITIES: CPT | Performed by: PHYSICAL THERAPIST

## 2018-07-09 PROCEDURE — 94640 AIRWAY INHALATION TREATMENT: CPT

## 2018-07-09 PROCEDURE — 77001 FLUOROGUIDE FOR VEIN DEVICE: CPT | Performed by: RADIOLOGY

## 2018-07-09 PROCEDURE — 6360000002 HC RX W HCPCS: Performed by: INTERNAL MEDICINE

## 2018-07-09 PROCEDURE — 36415 COLL VENOUS BLD VENIPUNCTURE: CPT

## 2018-07-09 PROCEDURE — 6360000002 HC RX W HCPCS

## 2018-07-09 PROCEDURE — 76937 US GUIDE VASCULAR ACCESS: CPT | Performed by: RADIOLOGY

## 2018-07-09 PROCEDURE — 6370000000 HC RX 637 (ALT 250 FOR IP): Performed by: INTERNAL MEDICINE

## 2018-07-09 PROCEDURE — 80061 LIPID PANEL: CPT

## 2018-07-09 PROCEDURE — 83735 ASSAY OF MAGNESIUM: CPT

## 2018-07-09 PROCEDURE — 2580000003 HC RX 258: Performed by: INTERNAL MEDICINE

## 2018-07-09 PROCEDURE — 5A1D70Z PERFORMANCE OF URINARY FILTRATION, INTERMITTENT, LESS THAN 6 HOURS PER DAY: ICD-10-PCS

## 2018-07-09 PROCEDURE — G8979 MOBILITY GOAL STATUS: HCPCS | Performed by: PHYSICAL THERAPIST

## 2018-07-09 PROCEDURE — 82570 ASSAY OF URINE CREATININE: CPT

## 2018-07-09 PROCEDURE — 80053 COMPREHEN METABOLIC PANEL: CPT

## 2018-07-09 PROCEDURE — 02HV33Z INSERTION OF INFUSION DEVICE INTO SUPERIOR VENA CAVA, PERCUTANEOUS APPROACH: ICD-10-PCS | Performed by: RADIOLOGY

## 2018-07-09 PROCEDURE — 90935 HEMODIALYSIS ONE EVALUATION: CPT

## 2018-07-09 PROCEDURE — 85730 THROMBOPLASTIN TIME PARTIAL: CPT

## 2018-07-09 PROCEDURE — 84156 ASSAY OF PROTEIN URINE: CPT

## 2018-07-09 PROCEDURE — 85610 PROTHROMBIN TIME: CPT

## 2018-07-09 RX ORDER — CARVEDILOL 6.25 MG/1
6.25 TABLET ORAL 2 TIMES DAILY WITH MEALS
Status: DISCONTINUED | OUTPATIENT
Start: 2018-07-09 | End: 2018-07-09

## 2018-07-09 RX ORDER — SODIUM CHLORIDE 9 MG/ML
INJECTION, SOLUTION INTRAVENOUS CONTINUOUS
Status: DISCONTINUED | OUTPATIENT
Start: 2018-07-09 | End: 2018-07-10

## 2018-07-09 RX ORDER — MIDODRINE HYDROCHLORIDE 5 MG/1
5 TABLET ORAL
Status: DISCONTINUED | OUTPATIENT
Start: 2018-07-09 | End: 2018-07-13 | Stop reason: HOSPADM

## 2018-07-09 RX ORDER — ALBUMIN (HUMAN) 12.5 G/50ML
25 SOLUTION INTRAVENOUS ONCE
Status: COMPLETED | OUTPATIENT
Start: 2018-07-09 | End: 2018-07-09

## 2018-07-09 RX ADMIN — HEPARIN SODIUM 5000 UNITS: 5000 INJECTION, SOLUTION INTRAVENOUS; SUBCUTANEOUS at 06:01

## 2018-07-09 RX ADMIN — IPRATROPIUM BROMIDE AND ALBUTEROL SULFATE 1 AMPULE: .5; 3 SOLUTION RESPIRATORY (INHALATION) at 09:51

## 2018-07-09 RX ADMIN — IPRATROPIUM BROMIDE AND ALBUTEROL SULFATE 1 AMPULE: .5; 3 SOLUTION RESPIRATORY (INHALATION) at 06:18

## 2018-07-09 RX ADMIN — IPRATROPIUM BROMIDE AND ALBUTEROL SULFATE 1 AMPULE: .5; 3 SOLUTION RESPIRATORY (INHALATION) at 16:29

## 2018-07-09 RX ADMIN — ALBUMIN (HUMAN) 25 G: 25 SOLUTION INTRAVENOUS at 10:43

## 2018-07-09 RX ADMIN — LEVOTHYROXINE SODIUM 100 MCG: 100 TABLET ORAL at 06:02

## 2018-07-09 RX ADMIN — CEFTRIAXONE 1 G: 1 INJECTION, POWDER, FOR SOLUTION INTRAMUSCULAR; INTRAVENOUS at 06:02

## 2018-07-09 RX ADMIN — ACETAMINOPHEN 650 MG: 325 TABLET, FILM COATED ORAL at 14:58

## 2018-07-09 RX ADMIN — MIDODRINE HYDROCHLORIDE 5 MG: 5 TABLET ORAL at 19:02

## 2018-07-09 RX ADMIN — SODIUM CHLORIDE: 9 INJECTION, SOLUTION INTRAVENOUS at 11:50

## 2018-07-09 ASSESSMENT — PAIN DESCRIPTION - PROGRESSION: CLINICAL_PROGRESSION: NOT CHANGED

## 2018-07-09 ASSESSMENT — PAIN DESCRIPTION - DESCRIPTORS
DESCRIPTORS: ACHING;DISCOMFORT
DESCRIPTORS: ACHING;DISCOMFORT

## 2018-07-09 ASSESSMENT — PAIN DESCRIPTION - ONSET
ONSET: ON-GOING
ONSET: ON-GOING

## 2018-07-09 ASSESSMENT — PAIN DESCRIPTION - FREQUENCY
FREQUENCY: INTERMITTENT
FREQUENCY: INTERMITTENT

## 2018-07-09 ASSESSMENT — PAIN DESCRIPTION - PAIN TYPE
TYPE: CHRONIC PAIN
TYPE: CHRONIC PAIN

## 2018-07-09 ASSESSMENT — PAIN SCALES - GENERAL
PAINLEVEL_OUTOF10: 0
PAINLEVEL_OUTOF10: 9
PAINLEVEL_OUTOF10: 0
PAINLEVEL_OUTOF10: 4

## 2018-07-09 ASSESSMENT — PAIN DESCRIPTION - LOCATION
LOCATION: BACK
LOCATION: GENERALIZED

## 2018-07-09 ASSESSMENT — PAIN DESCRIPTION - ORIENTATION: ORIENTATION: LOWER

## 2018-07-09 NOTE — PROGRESS NOTES
Physical Therapy    [id number]    PHYSICAL THERAPY INITIAL EVALUATION  Tentative placement recommendation: subacute  Equipment prescriptions needed:   none  Plan of care: Patient will be seen  daily for therapeutic exercise, functional retraining, endurance activities, balance exercises, family and patient education. AM-PAC Basic Mobility       AM-[facility] Mobility Inpatient   How much difficulty turning over in bed?: A Lot  How much difficulty sitting down on / standing up from a chair with arms?: A Lot  How much difficulty moving from lying on back to sitting on side of bed?: A Lot  How much help from another person moving to and from a bed to a chair?: A Lot  How much help from another person needed to walk in hospital room?: Total  How much help from another person for climbing 3-5 steps with a railing?: Total  AM-PAC Inpatient Mobility Raw Score : 10  AM-PAC Inpatient T-Scale Score : 32.29  Mobility Inpatient CMS 0-100% Score: 76.75  Mobility Inpatient CMS G-Code Modifier : CL    Order: evaluate and treat  Diagnosis:    1. Acute renal failure, unspecified acute renal failure type ([address]    2. Systolic congestive heart failure, unspecified congestive heart failure chronicity ([address]    3. General weakness    4.  Abnormal EKG      Past medical history:       Diagnosis Date    Arthritis     CAD (coronary artery disease)     had heart attack    [facility]congestive [facility] failure[facility]) 4/20/14    Echo 4/21/14 EF 90% stage II diastolic    CKD (chronic kidney disease) stage 3, GFR 30-59 ml/min 3/2/2012    Depression, acute     Diabetes mellitus ([address]     History of cardiovascular stress test 05/2012    VIABILITY STUDY WITH LEXISCAN    History of cardiovascular stress test 08/17/2012    lexiscan nuclear stress    Hyperlipidemia     Hypertension     Hypothyroid 3/3/2012    Liver disease     Psychiatric problem     Systolic CHF, chronic ([address] 7/3/3047    Systolic heart failure ([address] 2012    3/6/2012-stress Ambulation-Minimal assists   for 10 feet using  wheeled walker     Increase strength in affected muscle groups by 1/3 grade  Increase balance to allow for improvement towards functional goals. Increase endurance to allow for improvement towards functional goals.

## 2018-07-09 NOTE — CONSULTS
[address]                                   CONSULTATION    PATIENT NAME: Mary Garcia                      :        1945  MED REC NO:   [id number]                            ROOM:       [id number]  ACCOUNT NO:   [de-identified]                           ADMIT DATE: 2018  PROVIDER:     Clifton Brewer MD    CONSULT DATE:  2018    AGE:  75.    REASON FOR CONSULTATION:  Renal failure. HISTORY OF PRESENT ILLNESS:  The patient is a female that I followed  longitudinally in the office. Several years ago, she had an arteriovenous  fistula placed and I do not hear thrill and bruit at this present time. She comes in after feeling bad and was noted to be in severe renal failure. BUN and creatinine now at 134 and 8.3, potassium 5.4. Blood pressures are  slightly low. She was not put on fluids, but given diuretics due to mild  congestion on chest x-ray. She denies shortness of breath or orthopnea and  states she has not been eating well, has not felt well for a while. Ultrasound of the kidneys was done yesterday showing no evidence of  hydronephrosis or mass. Right kidney 11.4 cm, left kidney 10.7 cm. She  was seen by Dr. Batsheva Hong. She was admitted with the increasing weakness,  unable to walk, unable to hold or bear weight, some shortness of breath  noted, history of CHF noted. History of myocardial infarction noted. She  is diabetic. She is on Coreg and her blood pressure is low. Reviewing her  lab work, her creatinine is elevated, baseline three years ago in the  hospital is 3.1. I do not have my office records available at the present  time to see her most recent baseline. I had seen her few months ago in the  office. Last cardiac echo shows ejection fraction of 45%. She had  moderate LVH, mild tricuspid regurgitation, mild pulmonary hypertension  that was noted on an echo in .     PAST MEDICAL HISTORY:  Significant for arthritis, CAD, congestive heart  failure, chronic kidney disease, depression, diabetes, dyslipidemia,  hypertension, hypothyroidism, liver disease, systolic congestive heart  failure, cholecystectomy, appendectomy, echocardiogram, cardiac  catheterization, total shoulder arthroplasty, dialysis fistula in 2012. SOCIAL HISTORY:  Quit smoking 26 years ago. No alcohol use. FAMILY HISTORY:  Arthritis, cancer, diabetes. ALLERGIES:  ACE INHIBITORS, ANGIOTENSIN RECEPTOR BLOCKERS, reaction  unknown. MEDICATIONS PRIOR TO ADMISSION:  Include Bumex 2 mg with breakfast.  She  was on Coreg, calcitriol, hydralazine, insulin, vitamin D, and sevelamer. PHYSICAL EXAMINATION:  GENERAL:  She is alert, oriented. Respirations are not labored. VITAL SIGNS:  Temperature 97.4, pulse 66, respirations 16, blood pressure  95/42. HEENT:  Sclerae appeared to be anicteric. Head is normocephalic,  atraumatic. NECK:  Trachea is midline. No jugular venous distention noted, but no  thyromegaly noted. LUNGS:  Good carotid upstroke bilateral and equal.  Decreased breath sounds  at the bases. HEART:  Regular rate and rhythm. ABDOMEN:  Soft, nontender. Positive bowel sounds. No hepatosplenomegaly. EXTREMITIES:  Lower extremities with trace to +1 edema. No clubbing or  cyanosis noted. NEUROLOGIC:  Cranial nerves II through XII appeared to be intact. No CVA  tenderness. SKIN:  No diffuse rashes. LABORATORY RESULTS:  Sodium 139, potassium 5.4, chloride 98, CO2 of 15, BUN  and creatinine 134 and 8.3, glucose 90, calcium 8.1, phosphorus 8.6, total  protein 6.5, white count 4.3, hemoglobin 9.4, hematocrit 32.4, platelet  count 01,978. IMPRESSION:  1. Acute on chronic kidney failure. At this point needs dialysis. With  the hypotension, volume status, it is difficult to ascertain especially  with her morbid obesity.   She relates absolutely no orthopnea or shortness  of breath at the

## 2018-07-09 NOTE — PROGRESS NOTES
symmetric  LUNGS:  No increased work of breathing, diminished air exchange, clear to auscultation bilaterally, no crackles or wheezing  CARDIOVASCULAR:  Normal apical impulse, regular rate and rhythm, normal S1 and S2, no S3 or S4, and no murmur noted and no JVD, no carotid bruit, +2 pedal edema, good carotid upstroke bilaterally. CHEST: Right chest dialysis catheter present   ABDOMEN:  Soft, nontender  MUSCULOSKELETAL:  No clubbing no cyanosis. No redness, warmth, or swelling of the joints  NEUROLOGIC:  awake ,oriented x3  SKIN:  no bruising or bleeding, normal skin color, texture, turgor and no redness, warmth, or swelling      Cardiographics  I personally reviewed the telemetry monitor strips with the following interpretation: Sinus Rhythm with Right BBB alternating with Sinus Bradycardia     Echocardiogram: 4/21/2018  Summary   compared to previous, there has been interval improvement of MR, from   moderate on the previous study to mild on the current study. moderate LVH,   mild TR and mild PHTN noted on previous study is not evident on the   current study. Technically limited study. Mild asymmetric septal hypertrophy. Ejection fraction is visually estimated at 45%. Technically limited study, wall motion could not be evaluated. There is doppler evidence of stage II diastolic dysfunction. Mild mitral annular calcification. Mild mitral regurgitation is present. Cardiac Catheterization 3/8/2012   ANGIOGRAPHIC RESULTS:   1. Left main:  No angiographically significant stenosis. 2.    Left anterior descending:  Severe diffuse calcification. Diffuse      probable 30% to 40% stenosis throughout the proximal one-quarter of the      length of the vessel. Mild diffuse luminal irregularities. A.   D-1:  A 1-mm vessel. B.   D-2:  A 1-mm vessel. C.   D-3:  A 1-mm vessel. 3.    Circumflex:  Severe calcification throughout its length. Mid 100%      occlusion.          A. OM-1:  No angiographically significant stenosis. B.   OM-2:  Proximal 100% occlusion with faint filling of the distal          two-thirds of the vessel from left-to-left collaterals from the 1st          obtuse marginal.      4.    Right coronary artery:  Diffuse mild luminal irregularities. Dominant      vessel. The PDA is a 1.5-mm vessel and has mild to moderate diffuse      disease throughout its length. The mid portion stenosis may be      approaching 70%. Imaging  IR FLUORO GUIDED CVA DEVICE PLACEMENT   Final Result   1. Successful placement of a 13.5 [place] x 24 cm tunneled dialysis   catheter via the right internal jugular vein using ultrasound and   fluoroscopic guidance. IR ULTRASOUND GUIDANCE VASCULAR ACCESS   Final Result   Ultrasound guidance was provided during placement of a   tunneled dialysis catheter via the right internal jugular vein. US RETROPERITONEAL LIMITED   Final Result   1. The study is suboptimal due to body habitus. 2. However, that said, there is no evidence of hydronephrosis or mass. XR CHEST PORTABLE   Final Result   CHF      CT LUMBAR SPINE WO CONTRAST   Final Result   1. No acute fracture or subluxation. 2. Spondylosis with facet ligamentum flavum hypertrophy with mild   central spinal stenosis at L3-4, moderate L4-5.   3. Bilateral pleural effusions, at least moderate in size on the   right. 4. Renal atrophy. 5. Mild ascites. 6. Splenic artery aneurysm. CT Head WO Contrast   Final Result   Age-appropriate atrophy, no acute infarct or hemorrhage. CT Cervical Spine WO Contrast   Final Result   1. No fracture or subluxation. 2. Severe spondylosis and facet arthropathy C5-6. Facet arthropathy   C6-7.           Lab Review   Lab Results   Component Value Date     07/09/2018     07/08/2018     07/08/2018    K 5.4 07/09/2018    K 4.0 07/08/2018    K 4.6 07/08/2018    CO2 15 07/09/2018    CO2 17 07/08/2018    CO2 15 07/08/2018     07/09/2018     07/08/2018     07/08/2018    CREATININE 8.3 07/09/2018    CREATININE 8.2 07/08/2018    CREATININE 7.9 07/08/2018    GLUCOSE 90 07/09/2018    GLUCOSE 191 07/08/2018    GLUCOSE 194 07/08/2018    GLUCOSE 122 06/04/2012    GLUCOSE 67 05/21/2012    GLUCOSE 72 05/14/2012    CALCIUM 8.1 07/09/2018    CALCIUM 7.5 07/08/2018    CALCIUM 7.6 07/08/2018     Lab Results   Component Value Date    CKTOTAL 43 07/08/2018    CKTOTAL 74 04/21/2014    CKTOTAL 56 04/20/2014    CKMB 2.5 07/08/2018    CKMB 2.6 04/21/2014    CKMB 2.2 04/20/2014    TROPONINI 0.05 07/08/2018    TROPONINI <0.01 04/21/2014    TROPONINI <0.01 04/20/2014     Lab Results   Component Value Date    WBC 5.0 07/09/2018    WBC 4.3 07/09/2018    WBC 6.2 07/08/2018    HGB 8.8 07/09/2018    HGB 9.4 07/09/2018    HGB 9.8 07/08/2018    HCT 30.7 07/09/2018    HCT 32.4 07/09/2018    HCT 33.0 07/08/2018    MCV 84.6 07/09/2018    MCV 83.9 07/09/2018    MCV 83.8 07/08/2018     07/09/2018    PLT 53 07/09/2018     07/08/2018     Lab Results   Component Value Date    CHOL 83 07/09/2018    CHOL 133 04/22/2014    CHOL 135 08/16/2012    CHOL 113 05/05/2012    CHOL 87 03/22/2012    TRIG 126 07/09/2018    TRIG 246 04/22/2014    TRIG 187 08/16/2012    HDL 42 07/09/2018    HDL 29 04/22/2014    HDL 33.0 08/16/2012     I have personally reviewed the laboratory, cardiac diagnostic and radiographic testing as outlined above:    Assessment:       Patient Active Problem List    Diagnosis Date Noted    Abdominal pain 08/15/2012     Priority: High    Acute on chronic systolic heart failure ([facility] 05/04/2012     Priority: High    Hypertension, essential, benign 03/03/2012     Priority: Medium    Hypothyroid 03/03/2012     Priority: Medium    T2DM (type 2 diabetes mellitus) ([facility] 03/02/2012     Priority: Medium    PAF (paroxysmal atrial fibrillation) ([facility] 03/02/2012     Priority: Medium    CKD (chronic kidney disease) stage 4, GFR 15-29 ml/min ([facility]) 03/02/2012     Priority: Medium    Acute renal failure ([facility] 62/24/9288    Systolic congestive heart failure ([facility] 07/08/2018    General weakness 07/08/2018    Abnormal EKG 07/08/2018    Acute renal failure (ARF) ([facility] 07/08/2018    NSTEMI (non-ST elevated myocardial infarction) ([facility] 08/18/2012    SIRS (systemic inflammatory response syndrome) ([facility] 08/15/2012    Lower urinary tract infectious disease 08/15/2012    Elevated LFTs 08/15/2012    CAD (coronary artery disease) 08/15/2012     1. Congestive Heart Failure: Combined. Decompensated. Patient is for dialysis treatment today or tomorrow, will monitor BMP and Intake and Output with dialysis   2. Elevated Troponin: Likely secondary to co morbid conditions, will monitor closely   3. Hypotension: Etiology? Will discontinue antihypertensive medications and start Midodrine. Will check cortisol level in AM    4. Hyperkalemia: For dialysis treatment, will follow nephrology   5. Coronary Artery Disease: s/p cardiac catheterization 3/8/2012 Left anterior descending:  Severe diffuse calcification. Diffuse probable 30% to 40% stenosis. Circumflex:  Severe calcification throughout its length. Mid 100% occlusion. Right coronary artery:  Diffuse mild luminal irregularities. Dominant vessel. The PDA is a 1.5-mm vessel and has mild to moderate diffuse disease throughout its length. The mid portion stenosis may be approaching 70%. 6. Hyperlipidemia: on statin therapy   7. Chronic Kidney Disease: Worsening renal function, had dialysis catheter placed today, for dialysis today or tomorrow   8. Anemia: of chronic disease? 9. Type II Diabetes   10. Hypothyroid: on synthroid replacement       Recommendations:   1. Midodrine 5 mg TID   2. Will discontinue antihypertensive medications   3. Cortisol level in AM   4. CBC and BMP in AM   5. Intake and Output   6.  Further cardiac recommendations forthcoming pending patients clinical progression and diagnostic test findings     Discussed with Patient and family at bedside   Discussed with Dr. Lor Tovar     Electronically signed by STEFANIE Rudolph CNP on 7/9/2018 at 5:03 PM  I have personally participated in a face-to-face history and physical exam on the date of service. Reviewed chart, vitals, labs and radiologic studies. I also participated in medical decision making with Elizabeth Orozco CNP on the date of service and I agree with all of the pertinent clinical information, assessment and treatment plan. I have reviewed and edited the note above based on my findings during my history, exam, and decision making. Bilateral rhonchi  Heart regular with systolic murmur  + + Pedal edema  As above   NOTE: This report was transcribed using voice recognition software.  Every effort was made to ensure accuracy; however, inadvertent computerized transcription errors may be present

## 2018-07-09 NOTE — PROGRESS NOTES
Met with the patient and her daughter to review CHF literature. Reviewed Heart Failure Zones and the signs and symptoms of CHF. Also provided dietary guidelines with the patient. Both verbalized understanding.

## 2018-07-10 LAB
ALBUMIN SERPL-MCNC: 3.1 G/DL (ref 3.5–5.2)
ALP BLD-CCNC: 79 U/L (ref 35–104)
ALT SERPL-CCNC: 11 U/L (ref 0–32)
ANION GAP SERPL CALCULATED.3IONS-SCNC: 23 MMOL/L (ref 7–16)
ANISOCYTOSIS: ABNORMAL
AST SERPL-CCNC: 20 U/L (ref 0–31)
BASOPHILS ABSOLUTE: 0.05 E9/L (ref 0–0.2)
BASOPHILS RELATIVE PERCENT: 1 % (ref 0–2)
BILIRUB SERPL-MCNC: 0.3 MG/DL (ref 0–1.2)
BUN BLDV-MCNC: 90 MG/DL (ref 8–23)
BURR CELLS: ABNORMAL
CALCIUM SERPL-MCNC: 7.7 MG/DL (ref 8.6–10.2)
CHLORIDE BLD-SCNC: 99 MMOL/L (ref 98–107)
CO2: 19 MMOL/L (ref 22–29)
CORTISOL TOTAL: 16.31 MCG/DL (ref 2.68–18.4)
CREAT SERPL-MCNC: 6.4 MG/DL (ref 0.5–1)
EOSINOPHILS ABSOLUTE: 0.16 E9/L (ref 0.05–0.5)
EOSINOPHILS RELATIVE PERCENT: 3.5 % (ref 0–6)
GFR AFRICAN AMERICAN: 8
GFR NON-AFRICAN AMERICAN: 6 ML/MIN/1.73
GLUCOSE BLD-MCNC: 120 MG/DL (ref 74–109)
HBV SURFACE AB TITR SER: NORMAL {TITER}
HCT VFR BLD CALC: 30.1 % (ref 34–48)
HEMOGLOBIN: 8.8 G/DL (ref 11.5–15.5)
LYMPHOCYTES ABSOLUTE: 0.5 E9/L (ref 1.5–4)
LYMPHOCYTES RELATIVE PERCENT: 11 % (ref 20–42)
MAGNESIUM: 2.4 MG/DL (ref 1.6–2.6)
MCH RBC QN AUTO: 24.2 PG (ref 26–35)
MCHC RBC AUTO-ENTMCNC: 29.2 % (ref 32–34.5)
MCV RBC AUTO: 82.9 FL (ref 80–99.9)
METER GLUCOSE: 112 MG/DL (ref 70–110)
METER GLUCOSE: 115 MG/DL (ref 70–110)
METER GLUCOSE: 123 MG/DL (ref 70–110)
METER GLUCOSE: 135 MG/DL (ref 70–110)
MONOCYTES ABSOLUTE: 0.45 E9/L (ref 0.1–0.95)
MONOCYTES RELATIVE PERCENT: 9.5 % (ref 2–12)
NEUTROPHILS ABSOLUTE: 3.38 E9/L (ref 1.8–7.3)
NEUTROPHILS RELATIVE PERCENT: 75 % (ref 43–80)
OVALOCYTES: ABNORMAL
PDW BLD-RTO: 17.5 FL (ref 11.5–15)
PHOSPHORUS: 6.7 MG/DL (ref 2.5–4.5)
PLATELET # BLD: 125 E9/L (ref 130–450)
PMV BLD AUTO: 12.4 FL (ref 7–12)
POIKILOCYTES: ABNORMAL
POLYCHROMASIA: ABNORMAL
POTASSIUM SERPL-SCNC: 4 MMOL/L (ref 3.5–5)
RBC # BLD: 3.63 E12/L (ref 3.5–5.5)
SODIUM BLD-SCNC: 141 MMOL/L (ref 132–146)
TEAR DROP CELLS: ABNORMAL
TOTAL PROTEIN: 6.2 G/DL (ref 6.4–8.3)
WBC # BLD: 4.5 E9/L (ref 4.5–11.5)

## 2018-07-10 PROCEDURE — 85025 COMPLETE CBC W/AUTO DIFF WBC: CPT

## 2018-07-10 PROCEDURE — 97530 THERAPEUTIC ACTIVITIES: CPT

## 2018-07-10 PROCEDURE — 80053 COMPREHEN METABOLIC PANEL: CPT

## 2018-07-10 PROCEDURE — 87081 CULTURE SCREEN ONLY: CPT

## 2018-07-10 PROCEDURE — 94640 AIRWAY INHALATION TREATMENT: CPT

## 2018-07-10 PROCEDURE — 6360000002 HC RX W HCPCS: Performed by: INTERNAL MEDICINE

## 2018-07-10 PROCEDURE — 2580000003 HC RX 258: Performed by: INTERNAL MEDICINE

## 2018-07-10 PROCEDURE — 97535 SELF CARE MNGMENT TRAINING: CPT

## 2018-07-10 PROCEDURE — 6370000000 HC RX 637 (ALT 250 FOR IP): Performed by: INTERNAL MEDICINE

## 2018-07-10 PROCEDURE — G8988 SELF CARE GOAL STATUS: HCPCS

## 2018-07-10 PROCEDURE — 82962 GLUCOSE BLOOD TEST: CPT

## 2018-07-10 PROCEDURE — G8987 SELF CARE CURRENT STATUS: HCPCS

## 2018-07-10 PROCEDURE — 36415 COLL VENOUS BLD VENIPUNCTURE: CPT

## 2018-07-10 PROCEDURE — 83735 ASSAY OF MAGNESIUM: CPT

## 2018-07-10 PROCEDURE — 1200000000 HC SEMI PRIVATE

## 2018-07-10 PROCEDURE — 6370000000 HC RX 637 (ALT 250 FOR IP): Performed by: NURSE PRACTITIONER

## 2018-07-10 PROCEDURE — 6370000000 HC RX 637 (ALT 250 FOR IP): Performed by: FAMILY MEDICINE

## 2018-07-10 PROCEDURE — 97110 THERAPEUTIC EXERCISES: CPT

## 2018-07-10 PROCEDURE — 90935 HEMODIALYSIS ONE EVALUATION: CPT

## 2018-07-10 PROCEDURE — 84100 ASSAY OF PHOSPHORUS: CPT

## 2018-07-10 PROCEDURE — 80074 ACUTE HEPATITIS PANEL: CPT

## 2018-07-10 PROCEDURE — 87040 BLOOD CULTURE FOR BACTERIA: CPT

## 2018-07-10 PROCEDURE — 2580000003 HC RX 258

## 2018-07-10 PROCEDURE — 97166 OT EVAL MOD COMPLEX 45 MIN: CPT

## 2018-07-10 PROCEDURE — 82533 TOTAL CORTISOL: CPT

## 2018-07-10 RX ORDER — PRENATAL VIT 91/IRON/FOLIC/DHA 28-975-200
COMBINATION PACKAGE (EA) ORAL 2 TIMES DAILY
Status: DISCONTINUED | OUTPATIENT
Start: 2018-07-10 | End: 2018-07-13 | Stop reason: HOSPADM

## 2018-07-10 RX ADMIN — CEFTRIAXONE 1 G: 1 INJECTION, POWDER, FOR SOLUTION INTRAMUSCULAR; INTRAVENOUS at 06:31

## 2018-07-10 RX ADMIN — Medication 10 ML: at 21:02

## 2018-07-10 RX ADMIN — SODIUM CHLORIDE 125 MG: 9 INJECTION, SOLUTION INTRAVENOUS at 16:15

## 2018-07-10 RX ADMIN — CALCIUM ACETATE 1334 MG: 667 CAPSULE ORAL at 18:07

## 2018-07-10 RX ADMIN — IPRATROPIUM BROMIDE AND ALBUTEROL SULFATE 1 AMPULE: .5; 3 SOLUTION RESPIRATORY (INHALATION) at 18:16

## 2018-07-10 RX ADMIN — TERBINAFINE HYDROCHLORIDE: 1 CREAM TOPICAL at 16:15

## 2018-07-10 RX ADMIN — MIDODRINE HYDROCHLORIDE 5 MG: 5 TABLET ORAL at 08:57

## 2018-07-10 RX ADMIN — LEVOTHYROXINE SODIUM 100 MCG: 100 TABLET ORAL at 06:31

## 2018-07-10 RX ADMIN — DARBEPOETIN ALFA 60 MCG: 60 INJECTION, SOLUTION INTRAVENOUS; SUBCUTANEOUS at 16:23

## 2018-07-10 RX ADMIN — SODIUM CHLORIDE: 9 INJECTION, SOLUTION INTRAVENOUS at 06:36

## 2018-07-10 RX ADMIN — IPRATROPIUM BROMIDE AND ALBUTEROL SULFATE 1 AMPULE: .5; 3 SOLUTION RESPIRATORY (INHALATION) at 06:21

## 2018-07-10 NOTE — PROGRESS NOTES
Physical Therapy  Daily Treatment Note  7/10/2018  [id number]                      [facility]   [id number]                              1945    Patient Active Problem List   Diagnosis    T2DM (type 2 diabetes mellitus) ([facility]    PAF (paroxysmal atrial fibrillation) ([facility]    CKD (chronic kidney disease) stage 4, GFR 15-29 ml/min ([facility])    Hypertension, essential, benign    Hypothyroid    Acute on chronic systolic heart failure (HCC)    Abdominal pain    SIRS (systemic inflammatory response syndrome) ([facility]    Lower urinary tract infectious disease    Elevated LFTs    CAD (coronary artery disease)    [facility]STEM[facility]non-ST elevated [facility]) [facility]    Acute renal failure (HCC)    Systolic congestive heart failure ([facility])    General weakness    Abnormal EKG    Acute renal failure ([facility]F) ([facility]       Recommendation for discharge: subacute  Equipment prescriptions needed: none  AM-PAC Basic Mobility    Key: total(1); a lot (2); a little (3): none (4)  How much help from another person is needed. ..  2  1. Turning from your back to your side while in a flat bed without using bed rails?    2  2. Moving from lying on your back to sitting on the side of a flat bed w/o using bed rails? 2  3. Moving to and from a bed to a chair or wheelchair?     2  4. Standing up from a chair using your arms?    1  5. To walk in a hospital room?    1  6. Climbing 3-5 steps with a railing? Raw score:  10/24    Precautions: falls, alarm , dialysis     S: Patient cleared by nursing for treatment. Patient is agreeable to treatment. Family present and supportive. Pt agreed to sit at side of bed, transport arrived to take pt to dialysis   Pain status: (measured on a visual analog scale with 0=no pain and 10=excruciating pain) 0/10. O: Pt was instructed in and performed the following:   Bed Mobility- Supine to sit-NA     Scooting- NA     Sit to supine-NA  Rolling max assist to reposition taps. Rolled 3 x.

## 2018-07-10 NOTE — PROGRESS NOTES
dressing:  Maximal Assist      Bathing:  Maximal Assist  Minimal Assist    Bed Mobility: Maximal Assist for supine to sit,   Maximal Assist X2 to slide towards the [facility] ,      Minimal Assist    Functional Transfers:    Maximal Assist X2 for sit to stand transfers Minimal Assist Safety: fair        Functional Mobility:  {    Balance:   Sitting: fair    Standing: poor    Endurance: fair   Patients Goal: home    Treatment:able to brush hair cues to complete , clean glasses asked for assist , brushed teeth cues for sequencing and set up provided , daughter in to visit , slight confusion time and  following sequencing ,bathing mod assist frontside and max assist backside , fatigued after acticvity ,  max assist to EOB to dangle , needs assist X2 to transfer , bed alarm On   The following skilled interventions were introduced during initial assessment:  basic BADL retraining incorporating energy conservation technique, breathing technique strategies and safety/fall prevention technique  Assessment Summary: Performance Deficiencies include:  Decreased functional mobility:  generalized weakness  Decreased endurance:  poor stability/endurance  Decreased ADL status:  fair-poor LB dressing/bathing  Decreased safety awareness:  requires verbal cues for safe performance  Rehab Potential: good   Patient and/or family understands diagnosis, prognosis and plan of care: yes   Plan of care: Patient will be seen by OT 1-3 times per week  for therapeutic activity, bed mobility, functional transfers, functional mobility, safety, fall prevention, ADL re-training, balance and endurance activities,instruction and training in energy conservation principles, family/patient education until discharged.    Time In: 0900   Time Code treatment minutes: 30    · Medium Complexity  · History: Expanded review of medical records and additional review of physical, cognitive, or psychosocial history related to current functional performance  · Exam: 3-5 performance deficits  · Assistance/Modification: Min/mod assistance or modifications required to perform tasks. May have comorbidities that affect occupational performance.     Electronically signed by Nagi Moncada OT on 7/10/2018 at 10:04 AM    Nagi Moncada OTR/L#[id number]

## 2018-07-10 NOTE — PROGRESS NOTES
Nutrition Assessment    Type and Reason for Visit: Initial, Positive Nutrition Screen    Nutrition Recommendations: Continue current diet. Malnutrition Assessment:  · Malnutrition Status: No malnutrition  · Context: Chronic illness  · Findings of the 6 clinical characteristics of malnutrition (Minimum of 2 out of 6 clinical characteristics is required to make the diagnosis of moderate or severe Protein Calorie Malnutrition based on AND/ASPEN Guidelines):  1. Energy Intake-Less than or equal to 75%, greater than or equal to 5 days    2. Weight Loss-No significant weight loss,    3. Fat Loss-No significant subcutaneous fat loss,    4. Muscle Loss-No significant muscle mass loss,    5. Fluid Accumulation-Mild fluid accumulation, Extremities  6.  Strength-Normal    Nutrition Diagnosis:   · Problem: Inadequate energy intake  · Etiology: related to Altered taste perception (Secondary to renal dysfunction)     Signs and symptoms:  as evidenced by Patient report of, Intake 50-75%, Localized or generalized fluid accumulation (Pt reported poor appetite for a couple of weeks as well as taste changes R/T renal dysfunction.)    Nutrition Assessment:  · Nutrition-Focused Physical Findings: oriented x2, upper dentures, abdomen soft/rotound, active BS, +2 edema, +I/O  · Wound Type:  (No staged wounds noted.  Redness, excoriation, scabs noted.)  · Current Nutrition Therapies:  · Oral Diet Orders: Carb Control 4 Carbs/Meal   · Oral Diet intake: 51-75%  · Oral Nutrition Supplement (ONS) Orders: None  · Anthropometric Measures:  · Ht: 5' 5\" (165.1 cm)   · Current Body Wt: 268 lb 8.3 oz (121.8 kg) (7/10/18 bedside)  · Admission Body Wt: 268 lb (121.6 kg) (7/9/18 bed)  · Usual Body Wt:  (UTO, no EMR actual wt Hx for past 12 months.)  · Ideal Body Wt: 125 lb (56.7 kg), % Ideal Body 197%  · BMI Classification: BMI > or equal to 40.0 Obese Class III  · Comparative Standards (Estimated Nutrition Needs):  · Estimated Daily Total

## 2018-07-10 NOTE — PROGRESS NOTES
Patient is seen for CHF    Subjective:     Ms. Loyd Grates she feels sleepy, but better than yesterday. She did not express and complaints or concerns at this time. She denies any chest pain, palpitations, or lightheadedness. Patient laying in bed receiving a dialysis treatments, in no acute distress     ROS:  CONSTITUTIONAL:  negative for  fevers, chills  HEENT:  negative for earaches, nasal congestion and epistaxis  RESPIRATORY:  negative for  dry cough, cough with sputum, wheezing and hemoptysis  GASTROINTESTINAL:  negative for nausea, vomiting  MUSCULOSKELETAL:  negative for  myalgias, arthralgias  NEUROLOGICAL:  negative for visual disturbance, dysphagia    Medication side effects: none    Scheduled Meds:   midodrine  5 mg Oral TID WC    sodium chloride flush  10 mL Intravenous 2 times per day    aspirin  81 mg Oral Daily    calcitRIOL  0.25 mcg Oral Once per day on Sun Tue Thu Sat    insulin lispro  15 Units Subcutaneous TID WC    insulin glargine  25 Units Subcutaneous Nightly    levothyroxine  100 mcg Oral Daily    ipratropium-albuterol  1 ampule Inhalation Q4H WA    heparin (porcine)  5,000 Units Subcutaneous 3 times per day    insulin lispro  0-6 Units Subcutaneous TID     insulin lispro  0-3 Units Subcutaneous Nightly    cefTRIAXone (ROCEPHIN) IV  1 g Intravenous Q24H     Continuous Infusions:   sodium chloride 75 mL/hr at 07/10/18 0636    dextrose       PRN Meds:sodium chloride flush, acetaminophen, ondansetron, glucose, dextrose, glucagon (rDNA), dextrose      Objective:      Physical Exam:   /60   Pulse 65   Temp 98.5 °F (36.9 °C) (Oral)   Resp 18   Ht 5' 5\" (1.651 m)   Wt 268 lb 8.3 oz (121.8 kg)   SpO2 91%   BMI 44.68 kg/m²   CONSTITUTIONAL:  awake, Sleepy, cooperative, no apparent distress, and appears stated age  HEAD:  normocepalic, without obvious abnormality, atraumatic, pink, moist mucous membranes.   NECK:  Supple, symmetrical, trachea midline, thyroid symmetric  LUNGS:  No increased work of breathing, diminished air exchange, clear to auscultation bilaterally, no crackles or wheezing  CARDIOVASCULAR:  Normal apical impulse, regular rate and rhythm, normal S1 and S2, no S3 or S4, and no murmur noted and no JVD, no carotid bruit, +1 pedal edema, good carotid upstroke bilaterally. CHEST: Right chest dialysis catheter present   ABDOMEN:  Soft, nontender  MUSCULOSKELETAL:  No clubbing no cyanosis. No redness, warmth, or swelling of the joints  NEUROLOGIC:  awake ,oriented x3  SKIN:  no bruising or bleeding, normal skin color, texture, turgor and no redness, warmth, or swelling      Cardiographics  I personally reviewed the telemetry monitor strips with the following interpretation: Sinus Rhythm     Echocardiogram: 4/21/2018  Summary   compared to previous, there has been interval improvement of MR, from   moderate on the previous study to mild on the current study. moderate LVH,   mild TR and mild PHTN noted on previous study is not evident on the   current study. Technically limited study. Mild asymmetric septal hypertrophy. Ejection fraction is visually estimated at 45%. Technically limited study, wall motion could not be evaluated. There is doppler evidence of stage II diastolic dysfunction. Mild mitral annular calcification. Mild mitral regurgitation is present. Cardiac Catheterization 3/8/2012   ANGIOGRAPHIC RESULTS:   1. Left main:  No angiographically significant stenosis. 2.    Left anterior descending:  Severe diffuse calcification. Diffuse      probable 30% to 40% stenosis throughout the proximal one-quarter of the      length of the vessel. Mild diffuse luminal irregularities. A.   D-1:  A 1-mm vessel. B.   D-2:  A 1-mm vessel. C.   D-3:  A 1-mm vessel. 3.    Circumflex:  Severe calcification throughout its length. Mid 100%      occlusion.          A.   OM-1:  No angiographically significant stenosis. B.   OM-2:  Proximal 100% occlusion with faint filling of the distal          two-thirds of the vessel from left-to-left collaterals from the 1st          obtuse marginal.      4.    Right coronary artery:  Diffuse mild luminal irregularities. Dominant      vessel. The PDA is a 1.5-mm vessel and has mild to moderate diffuse      disease throughout its length. The mid portion stenosis may be      approaching 70%. Imaging  IR FLUORO GUIDED CVA DEVICE PLACEMENT   Final Result   1. Successful placement of a 13.5 Haitian x 24 cm tunneled dialysis   catheter via the right internal jugular vein using ultrasound and   fluoroscopic guidance. IR ULTRASOUND GUIDANCE VASCULAR ACCESS   Final Result   Ultrasound guidance was provided during placement of a   tunneled dialysis catheter via the right internal jugular vein. US RETROPERITONEAL LIMITED   Final Result   1. The study is suboptimal due to body habitus. 2. However, that said, there is no evidence of hydronephrosis or mass. XR CHEST PORTABLE   Final Result   CHF      CT LUMBAR SPINE WO CONTRAST   Final Result   1. No acute fracture or subluxation. 2. Spondylosis with facet ligamentum flavum hypertrophy with mild   central spinal stenosis at L3-4, moderate L4-5.   3. Bilateral pleural effusions, at least moderate in size on the   right. 4. Renal atrophy. 5. Mild ascites. 6. Splenic artery aneurysm. CT Head WO Contrast   Final Result   Age-appropriate atrophy, no acute infarct or hemorrhage. CT Cervical Spine WO Contrast   Final Result   1. No fracture or subluxation. 2. Severe spondylosis and facet arthropathy C5-6. Facet arthropathy   C6-7.           Lab Review   Lab Results   Component Value Date     07/10/2018     07/09/2018     07/08/2018    K 4.0 07/10/2018    K 5.4 07/09/2018    K 4.0 07/08/2018    CO2 19 07/10/2018    CO2 15 07/09/2018    CO2 17 07/08/2018    BUN 90 07/10/2018 Priority: Medium    Acute renal failure ([facility] 91/41/7318    Systolic congestive heart failure ([facility] 07/08/2018    General weakness 07/08/2018    Abnormal EKG 07/08/2018    Acute renal failure (ARF) ([facility] 07/08/2018    NSTEMI (non-ST elevated myocardial infarction) ([facility] 08/18/2012    SIRS (systemic inflammatory response syndrome) ([facility] 08/15/2012    Lower urinary tract infectious disease 08/15/2012    Elevated LFTs 08/15/2012    CAD (coronary artery disease) 08/15/2012     IMPRESSION:   1. Congestive Heart Failure: Combined. Decompensated. Patient is not responding to diuretic therapy due to renal function. Patient is now on dialysis and remains fluid balance positive after dialysis yesterday and is receiving another treatment today, will monitor BMP and Intake and Output. Will continue current treatment   2. Elevated Troponin: Likely secondary to co morbid conditions. 3. Hypotension: Etiology? Improved with midodrine, will continue. Cortisol level normal    4. Hyperkalemia: Resolved, after dialysis, will follow nephrology   5. Coronary Artery Disease: s/p cardiac catheterization 3/8/2012 Left anterior descending:  Severe diffuse calcification. Diffuse probable 30% to 40% stenosis. Circumflex:  Severe calcification throughout its length. Mid 100% occlusion. Right coronary artery:  Diffuse mild luminal irregularities. Dominant vessel. The PDA is a 1.5-mm vessel and has mild to moderate diffuse disease throughout its length. The mid portion stenosis may be approaching 70%. 6. Hyperlipidemia: on statin therapy   7. Chronic Kidney Disease: Will follow nephrology, received 1st dialysis treatment last night and is currently receiving another treatment today. 8. Anemia: of chronic disease? 9. Type II Diabetes   10. Hypothyroid: on synthroid replacement       Recommendations:   1. Will continue current treatment at this time   2. CBC and BMP in AM   3. Intake and Output   4.  Further cardiac recommendations forthcoming pending patients clinical progression and diagnostic test findings     Discussed with Patient and family at bedside   Discussed with Dr. Katy Buchanan     Electronically signed by STEFANIE Huynh CNP on 7/10/2018 at 1:01 PM  I personally reviewed the history,and reviewed labs, radiology and monitor strips. I have reviewed the key elements of all parts of the encounter with  I agree with the assessment, plan and orders as documented by Cynthia Robbins CNP. NOTE: This report was transcribed using voice recognition software.  Every effort was made to ensure accuracy; however, inadvertent computerized transcription errors may be present

## 2018-07-10 NOTE — CARE COORDINATION
Ss note:7/10/2018.2:29 PM Met with dtr Fany Pappas, snf choices are 1. [facility] 2 community skilled. Referral made to [facility] at [address]. Will await acceptance and pt will require a PRECERT.  JI Padron

## 2018-07-10 NOTE — PROGRESS NOTES
[facility] MD Erica Castillo MD Valjean Berne, MD. Marylou Brown MD   Progress Note    7/10/2018    SUBJECTIVE:   Seen today , on RRT on RA . Tolerating well . Access [facility]      PROBLEM LIST:    Principal Problem:    Acute renal failure (ARF) ([facility])  Active Problems:    CKD (chronic kidney disease) stage 4, GFR 15-29 ml/min ([facility])    CAD (coronary artery disease)    Systolic congestive heart failure ([facility])    General weakness    Abnormal EKG  Resolved Problems:    * No resolved hospital problems. *       DIET:    DIET CARB CONTROL;        Allergies : Ace inhibitors and Angiotensin receptor blockers    Past Medical History:   Diagnosis Date    Arthritis     CAD (coronary artery disease)     had heart attack    CHF (congestive heart failure) ([facility]) 4/20/14    Echo 4/21/14 EF 91% stage II diastolic    CKD (chronic kidney disease) stage 3, GFR 30-59 ml/min 3/2/2012    Depression, acute     Diabetes mellitus ([facility]     History of cardiovascular stress test 05/2012    VIABILITY STUDY WITH LEXISCAN    History of cardiovascular stress test 08/17/2012    lexiscan nuclear stress    History of echocardiogram 07/09/2018    EF 32%    Hyperlipidemia     Hypertension     Hypothyroid 3/3/2012    Liver disease     Psychiatric problem     Systolic CHF, chronic ([address] 2/0/9704    Systolic heart failure ([address] 2012    3/6/2012-stress revealed an LVEF of 32%, evidence of stage I diastolic dysfuction       Past Surgical History:   Procedure Laterality Date    APPENDECTOMY      CARDIAC CATHETERIZATION  03/08/2012    Cath, Dr. Jaren Dias, No Intervention    CHOLECYSTECTOMY      DIALYSIS FISTULA CREATION  april 2012    creation right arm a/v fistula    ECHO COMPL W DOP COLOR FLOW  3/3/2012 EF 55%         ECHO COMPL W DOP COLOR FLOW  3/28/2012 EF 42%         ECHO LIMITED/FOLLOW UP  3/9/2012         FRACTURE SURGERY      SHOULDER ARTHROPLASTY  2011       Family History   Problem Relation 0430 110/60 - - - - - -   07/10/18 0215 (!) 105/44 97.9 °F (36.6 °C) Oral 70 22 94 % -   07/10/18 0155 (!) 177/148 98 °F (36.7 °C) - 79 - - 268 lb 8.3 oz (121.8 kg)   07/10/18 0130 (!) 140/48 - - 67 - - -   07/10/18 0115 (!) 104/25 - - 70 - - -   07/10/18 0100 (!) 103/38 - - 75 - - -   07/10/18 0045 (!) 115/45 - - 76 - - -   07/10/18 0030 (!) 103/52 - - 74 - - -   07/10/18 0015 (!) 122/44 - - 66 - - -   07/10/18 0000 (!) 144/61 - - 86 - - -   07/09/18 2345 (!) 82/32 - - 81 - - -   07/09/18 2330 99/60 - - 56 - - -   07/09/18 2305 (!) 96/27 98.2 °F (36.8 °C) - 60 18 - 268 lb 8.3 oz (121.8 kg)   07/09/18 2100 118/62 97.8 °F (36.6 °C) Oral 61 18 95 % -   07/09/18 1500 (!) 95/40 - - - - - -   @      Intake/Output Summary (Last 24 hours) at 07/10/18 1315  Last data filed at 07/10/18 6118   Gross per 24 hour   Intake             1440 ml   Output              709 ml   Net              731 ml         Wt Readings from Last 3 Encounters:   07/10/18 268 lb 8.3 oz (121.8 kg)   05/08/14 259 lb 12.8 oz (117.8 kg)   04/25/14 255 lb 12.8 oz (116 kg)       Constitutional:  in no acute distress  Neck: supple   Cardiovascular: RRR  Respiratory:  Poor inspiratory effort b/l   Gastrointestinal:  Soft, nontender, nondistended, NABS obses   Ext: 2+ LE  Skin: dry,  Neuro: awake, alert, interactive      DATA:    Recent Labs      07/09/18   0810  07/09/18   1523  07/10/18   0742   WBC  4.3*  5.0  4.5   HGB  9.4*  8.8*  8.8*   HCT  32.4*  30.7*  30.1*   MCV  83.9  84.6  82.9   PLT  53*  123*  125*     Recent Labs      07/08/18   0015  07/08/18   0834  07/09/18   0810  07/10/18   0742   NA  140  139  139  141   K  4.6  4.0  5.4*  4.0   CL  99  100  98  99   CO2  15*  17*  15*  19*   MG  2.6   --   2.8*  2.4   PHOS   --    --   8.6*  6.7*   BUN  130*  124*  134*  90*   CREATININE  7.9*  8.2*  8.3*  6.4*   ALT  7   --   8  11   AST  14   --   20  20   BILIDIR  <0.2   --    --    --    BILITOT  0.3   --   0.3  0.3   ALKPHOS  86   --   77  79 No results found for: LABPROT    ASSESSMENT / RECOMMENDATIONS:   ALECIA/ADVANCED CKD    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 IDDM   MBD :  Calcitriol     Seen on RRT 2th treatment ,  tolerating well .   . Even   Stop iv fluids . Start iv ferrlicit with aranescp .    Check pth  Add phoslo            Electronically signed by Bryan Canavan, MD on 7/10/2018 at 1:15 PM

## 2018-07-10 NOTE — PLAN OF CARE
Problem: Nutrition  Goal: Optimal nutrition therapy  Outcome: Ongoing  Nutrition Problem: Inadequate energy intake  Intervention: Food and/or Nutrient Delivery: Continue current diet  Nutritional Goals: Consume 75%+of diet

## 2018-07-10 NOTE — PROGRESS NOTES
9.4*  8.8*  8.8*   PLT  53*  123*  125*     Recent Labs      07/08/18   0834  07/09/18   0810  07/10/18   0742   NA  139  139  141   K  4.0  5.4*  4.0   CL  100  98  99   CO2  17*  15*  19*   BUN  124*  134*  90*   CREATININE  8.2*  8.3*  6.4*   GLUCOSE  191*  90  120*     Recent Labs      07/08/18   0015  07/09/18   0810  07/10/18   0742   BILITOT  0.3  0.3  0.3   ALKPHOS  86  77  79   AST  14  20  20   ALT  7  8  11     Recent Labs      07/08/18   0118  07/09/18   0810   INR  1.1  1.2     Recent Labs      07/08/18   0015   CKTOTAL  43   CKMB  2.5   TROPONINI  0.05*         Ct Head Wo Contrast    Result Date: 7/8/2018  Location:100 Exam: CT HEAD WO CONTRAST Comparison: March 16, 2009 History:  Trauma from fall, pain Findings: A noncontrast spiral CT of the brain was performed. Coronal and sagittal reconstructions. Automated dose exposure control was utilized for this examination. Prominent ventricles and sulci with periventricular leukomalacia. No evidence for acute hemorrhage or infarction. No mass-effect midline shift or herniation. Age-appropriate atrophy, no acute infarct or hemorrhage. Ct Cervical Spine Wo Contrast    Result Date: 7/8/2018  Location:100 Exam: CT CERVICAL SPINE WO CONTRAST Comparison: None History:  Trauma from fall, pain Findings: Spiral CT of cervical spine without contrast. Coronal and sagittal reconstructions were obtained. No soft tissue swelling or hematoma identified. Cervical vertebrae maintain normal stature and alignment. No evidence of fracture or subluxation. There is no spinal or foraminal stenosis. Facets are normal alignment. C1-C2 articulation is normal. Spondylosis with severe spurring sclerosis C5-6. Severe facet arthropathy right C5-6 C6-7.     1. No fracture or subluxation. 2. Severe spondylosis and facet arthropathy C5-6. Facet arthropathy C6-7.     Ct Lumbar Spine Wo Contrast    Result Date: 7/8/2018  Reading location: 100 Exam: CT LUMBAR SPINE WO CONTRAST Comparison: None History: Injury from fall, pain TECHNIQUE: Spiral CT of the lumbar spine. Coronal and sagittal reconstructions were obtained. Automated dose exposure control was utilized for this examination. Findings: The lumbar vertebral bodies maintain normal stature and alignment. No evidence of fracture or subluxation. No lytic or blastic destructive changes. At L4-5 there is vacuum disc phenomenon with bulging disc which along with facet ligamentum flavum hypertrophy resulting in mild to moderate spinal stenosis. There is mild spinal stenosis at L3-4 as well secondary to the facet ligamentum flavum hypertrophy. Associated mild foraminal stenosis at both these levels. The other disc levels are without evidence of significant spinal stenosis or foraminal stenosis. Renal atrophy. Splenic artery aneurysm in the splenic hilum which measures 2 x 2.4 cm. There are bilateral pleural effusions. There is a small amount of ascites. 1. No acute fracture or subluxation. 2. Spondylosis with facet ligamentum flavum hypertrophy with mild central spinal stenosis at L3-4, moderate L4-5. 3. Bilateral pleural effusions, at least moderate in size on the right. 4. Renal atrophy. 5. Mild ascites. 6. Splenic artery aneurysm. Xr Chest Portable    Result Date: 7/8/2018  Location:100 Exam: XR CHEST PORTABLE Comparison: April 23, 2014 History:   Chest pain CHF Findings: A single frontal portable view of the chest demonstrates cardiomegaly with vascular congestion small effusions. CHF    Us Retroperitoneal Limited    Result Date: 7/8/2018  Reading location:  200 CLINICAL STATEMENT: Acute renal failure TECHNIQUE: The exam is suboptimal due to body habitus. High-resolution real-time sonography of the kidneys was performed. Color-flow was employed as well. COMPARISON: March 21, 2012. The right kidney measures 11.4 and the left kidney measures 10.7 cm in craniocaudad dimension. No evidence of hydronephrosis.  No definite masses or

## 2018-07-10 NOTE — PROGRESS NOTES
There was an order for a wound culture. Patient was turned and body was examined there were no open or draining wounds on body.  Most were scabs that had no drainage

## 2018-07-10 NOTE — PROGRESS NOTES
Spoke with Julián Benoit @ Dr. Lance Ormond office re: consult.  Electronically signed by Peter Yan on 7/10/2018 at 1:34 PM

## 2018-07-10 NOTE — PROGRESS NOTES
Subjective:  Melquiades Miller was seen and examined at bedside today. The patient's questions were answered and tests were reviewed. There were no new problems reported overnight. Patient is tolerating current diet. Feels better than admission - awaiting hemodialysis either tonight or tomorrow    A complete review of systems and social history was completed on admission and remains unchanged unless otherwise noted    Scheduled Meds:   midodrine  5 mg Oral TID WC    sodium chloride flush  10 mL Intravenous 2 times per day    aspirin  81 mg Oral Daily    calcitRIOL  0.25 mcg Oral Once per day on Sun Tue Thu Sat    insulin lispro  15 Units Subcutaneous TID WC    insulin glargine  25 Units Subcutaneous Nightly    levothyroxine  100 mcg Oral Daily    ipratropium-albuterol  1 ampule Inhalation Q4H WA    heparin (porcine)  5,000 Units Subcutaneous 3 times per day    insulin lispro  0-6 Units Subcutaneous TID WC    insulin lispro  0-3 Units Subcutaneous Nightly    cefTRIAXone (ROCEPHIN) IV  1 g Intravenous Q24H     Continuous Infusions:   sodium chloride 75 mL/hr at 07/09/18 1150    dextrose       PRN Meds:sodium chloride flush, acetaminophen, ondansetron, glucose, dextrose, glucagon (rDNA), dextrose    Objective:  BP (!) 95/40   Pulse 72   Temp 97.3 °F (36.3 °C) (Oral)   Resp 18   Ht 5' 5\" (1.651 m)   Wt 268 lb 9.6 oz (121.8 kg)   SpO2 94%   BMI 44.70 kg/m²   In: 0   Out: 100    In: 0   Out: 100 [Urine:100]     AAO x 3, currently in NAD but lethargic  RRR, pos S1, S2  CTA bilaterally, no wheeze, rales or rhonchi  bowel sounds present, nontender, nondistended  No clubbing, cyanosis, positive edema  No neuro changes   No obvious rashes or lesions.     Recent Labs      07/08/18   0015  07/09/18   0810  07/09/18   1523   WBC  6.2  4.3*  5.0   HGB  9.8*  9.4*  8.8*   PLT  121*  53*  123*     Recent Labs      07/08/18   0015  07/08/18   0834  07/09/18   0810   NA  140  139  139   K  4.6  4.0  5.4*   CL  99  100 98   CO2  15*  17*  15*   BUN  130*  124*  134*   CREATININE  7.9*  8.2*  8.3*   GLUCOSE  194*  191*  90     Recent Labs      07/08/18   0015  07/09/18   0810   BILITOT  0.3  0.3   ALKPHOS  86  77   AST  14  20   ALT  7  8     Recent Labs      07/08/18   0118  07/09/18   0810   INR  1.1  1.2     Recent Labs      07/08/18   0015   CKTOTAL  43   CKMB  2.5   TROPONINI  0.05*         Ct Head Wo Contrast    Result Date: 7/8/2018  Location:100 Exam: CT HEAD WO CONTRAST Comparison: March 16, 2009 History:  Trauma from fall, pain Findings: A noncontrast spiral CT of the brain was performed. Coronal and sagittal reconstructions. Automated dose exposure control was utilized for this examination. Prominent ventricles and sulci with periventricular leukomalacia. No evidence for acute hemorrhage or infarction. No mass-effect midline shift or herniation. Age-appropriate atrophy, no acute infarct or hemorrhage. Ct Cervical Spine Wo Contrast    Result Date: 7/8/2018  Location:100 Exam: CT CERVICAL SPINE WO CONTRAST Comparison: None History:  Trauma from fall, pain Findings: Spiral CT of cervical spine without contrast. Coronal and sagittal reconstructions were obtained. No soft tissue swelling or hematoma identified. Cervical vertebrae maintain normal stature and alignment. No evidence of fracture or subluxation. There is no spinal or foraminal stenosis. Facets are normal alignment. C1-C2 articulation is normal. Spondylosis with severe spurring sclerosis C5-6. Severe facet arthropathy right C5-6 C6-7.     1. No fracture or subluxation. 2. Severe spondylosis and facet arthropathy C5-6. Facet arthropathy C6-7. Ct Lumbar Spine Wo Contrast    Result Date: 7/8/2018  Reading location: 100 Exam: CT LUMBAR SPINE WO CONTRAST Comparison: None History: Injury from fall, pain TECHNIQUE: Spiral CT of the lumbar spine. Coronal and sagittal reconstructions were obtained.  Automated dose exposure control was utilized for this examination. Findings: The lumbar vertebral bodies maintain normal stature and alignment. No evidence of fracture or subluxation. No lytic or blastic destructive changes. At L4-5 there is vacuum disc phenomenon with bulging disc which along with facet ligamentum flavum hypertrophy resulting in mild to moderate spinal stenosis. There is mild spinal stenosis at L3-4 as well secondary to the facet ligamentum flavum hypertrophy. Associated mild foraminal stenosis at both these levels. The other disc levels are without evidence of significant spinal stenosis or foraminal stenosis. Renal atrophy. Splenic artery aneurysm in the splenic hilum which measures 2 x 2.4 cm. There are bilateral pleural effusions. There is a small amount of ascites. 1. No acute fracture or subluxation. 2. Spondylosis with facet ligamentum flavum hypertrophy with mild central spinal stenosis at L3-4, moderate L4-5. 3. Bilateral pleural effusions, at least moderate in size on the right. 4. Renal atrophy. 5. Mild ascites. 6. Splenic artery aneurysm. Xr Chest Portable    Result Date: 7/8/2018  Location:100 Exam: XR CHEST PORTABLE Comparison: April 23, 2014 History:   Chest pain CHF Findings: A single frontal portable view of the chest demonstrates cardiomegaly with vascular congestion small effusions. CHF    Us Retroperitoneal Limited    Result Date: 7/8/2018  Reading location:  200 CLINICAL STATEMENT: Acute renal failure TECHNIQUE: The exam is suboptimal due to body habitus. High-resolution real-time sonography of the kidneys was performed. Color-flow was employed as well. COMPARISON: March 21, 2012. The right kidney measures 11.4 and the left kidney measures 10.7 cm in craniocaudad dimension. No evidence of hydronephrosis. No definite masses or calculi. A Wiseman catheter is in the urinary bladder. It is decompressed. 1. The study is suboptimal due to body habitus.  2. However, that said, there is no evidence of hydronephrosis or

## 2018-07-10 NOTE — CONSULTS
[facility]  Consult Note  Admit Date: 7/7/2018 11:42 PM  1945    Reason for Consult:    Chief Complaint   Patient presents with    Fatigue     generalized weakness began 2 weeks ago     Requesting Physician:  No att. providers found  PCP: Piedad Min MD  History Obtained From:  patient  ID consulted for blood and urine cultures on hospital day 2    HPI:   The patient is a 75 y.o. female who presents with generalized weakness on HD for worsening ALECIA on CKD. She has been hypotensive. She states she has been unable to walk at home and spends most of her day in bed, she has bed sores. ECHO was done yesterday and shows worsening EF compared to ECHO done in April.     Cardiology and Nephrology are following    She has never seen ID  She has had bed bugs at home and was found to have one live bug and one dead bug on her upon admission  She has pets  She does not smoke tobacco  She lives at home  She denies recent travel      Current Medications:   Current Facility-Administered Medications   Medication Dose Route Frequency Provider Last Rate Last Dose    calcium acetate (PHOSLO) capsule 1,334 mg  1,334 mg Oral TID  Ava Hanson MD        ferric gluconate (FERRLECIT) 125 mg in sodium chloride 0.9 % 100 mL IVPB  125 mg Intravenous Daily Ava Hanson MD        darbepoetin jitendra-polysorbate SEVEN [facility]) injection 60 mcg  60 mcg Subcutaneous Weekly Ava Hanson MD        midodrine (PROAMATINE) tablet 5 mg  5 mg Oral TID  Leobardo RochafleeDAKOTAH loveN - CNP   5 mg at 07/10/18 0857    sodium chloride flush 0.9 % injection 10 mL  10 mL Intravenous 2 times per day Chick Handler, DO   10 mL at 07/08/18 2100    sodium chloride flush 0.9 % injection 10 mL  10 mL Intravenous PRN Chick Handler, DO        acetaminophen (TYLENOL) tablet 650 mg  650 mg Oral Q4H PRN Chick Handler, DO   650 mg at 07/09/18 1458    aspirin chewable tablet 81 mg  81 mg Oral Daily Chick Handler, DO   Stopped at 07/10/18 7302    calcitRIOL (ROCALTROL) capsule 0.25 mcg  0.25 mcg Oral Once per day on Sun Tue Thu Sat Eden Ramirez, DO   Stopped at 07/10/18 0858    insulin lispro (HUMALOG) injection vial 15 Units  15 Units Subcutaneous TID  Eden Perezromero, DO   Stopped at 07/10/18 1305    insulin glargine (LANTUS) injection vial 25 Units  25 Units Subcutaneous Nightly Eden Ramirez, DO        levothyroxine (SYNTHROID) tablet 100 mcg  100 mcg Oral Daily Eden Ramirez, DO   100 mcg at 07/10/18 0631    ipratropium-albuterol (DUONEB) nebulizer solution 1 ampule  1 ampule Inhalation Q4H WA Eden Ramirez, DO   1 ampule at 07/10/18 3541    ondansetron (ZOFRAN) injection 4 mg  4 mg Intravenous Q6H PRN Eden Ramirez, DO        heparin (porcine) injection 5,000 Units  5,000 Units Subcutaneous 3 times per day Eden Ramirez, DO   Stopped at 07/10/18 0653    glucose (GLUTOSE) 40 % oral gel 15 g  15 g Oral PRN Eden Ramirez, DO        dextrose 50 % solution 12.5 g  12.5 g Intravenous PRN Eden Ramirez, DO        glucagon (rDNA) injection 1 mg  1 mg Intramuscular PRN Eden Ramirez, DO        dextrose 5 % solution  100 mL/hr Intravenous PRN Eden Ramirez, DO        insulin lispro (HUMALOG) injection vial 0-6 Units  0-6 Units Subcutaneous TID  Eden Ramirez, DO   Stopped at 07/10/18 1305    insulin lispro (HUMALOG) injection vial 0-3 Units  0-3 Units Subcutaneous Nightly Eden Ramirez, DO        cefTRIAXone (ROCEPHIN) 1 g IVPB in 50 mL D5W minibag  1 g Intravenous Q24H Eden Ramirez, DO   Stopped at 07/10/18 0701     Allergies:  Ace inhibitors and Angiotensin receptor blockers  Medical Hx  Past Medical History:   Diagnosis Date    Arthritis     CAD (coronary artery disease)     had heart attack    CHF (congestive heart failure) ([facility] [address] 4/20/14    Echo 4/21/14 EF 21% stage II diastolic    CKD (chronic kidney disease) stage 3, GFR 30-59 ml/min 3/2/2012    Depression, acute     Diabetes mellitus ([facility] [address]     History of cardiovascular stress test 05/2012 VIABILITY STUDY WITH LEXISCAN    History of cardiovascular stress test 08/17/2012    lexiscan nuclear stress    History of echocardiogram 07/09/2018    EF 32%    Hyperlipidemia     Hypertension     Hypothyroid 3/3/2012    Liver disease     Psychiatric problem     Systolic CHF, chronic ([facility] [address] 4/8/7131    Systolic heart failure ([facility] [address] 2012    3/6/2012-stress revealed an LVEF of 32%, evidence of stage I diastolic dysfuction      Immunization History   Administered Date(s) Administered    Pneumococcal Polysaccharide ([id number]) 03/23/2012, 08/16/2012     Past Surgical History:   Procedure Laterality Date    APPENDECTOMY      CARDIAC CATHETERIZATION  03/08/2012    Cath, Dr. Ivis Mtz, No Intervention    CHOLECYSTECTOMY      DIALYSIS FISTULA CREATION  april 2012    creation right arm a/v fistula    ECHO COMPL W DOP COLOR FLOW  3/3/2012 EF 55%         ECHO COMPL W DOP COLOR FLOW  3/28/2012 EF 42%         ECHO LIMITED/FOLLOW UP  3/9/2012         FRACTURE SURGERY      SHOULDER ARTHROPLASTY  2011     Family Hx  family history includes Arthritis in her mother; Cancer in her mother; Diabetes in her mother and sister; Zeina Sron / Maria Del Carmen Liu in her mother. Social hx  Social History     Social History    Marital status:      Spouse name: N/A    Number of children: 4    Years of education: 12     Occupational History    Not on file.      Social History Main Topics    Smoking status: Former Smoker     Packs/day: 2.00     Years: 30.00     Quit date: 3/21/1992    Smokeless tobacco: Never Used    Alcohol use No    Drug use: No    Sexual activity: No     Other Topics Concern    Not on file     Social History Narrative    No narrative on file       ROS:    General ROS: positive for  - weakness  Hematological and Lymphatic ROS: positive for - fatigue  Respiratory ROS: negative  Cardiovascular ROS: negative  Gastrointestinal ROS: negative  Genito-Urinary ROS: negative  Musculoskeletal ROS: positive for - gait disturbance and muscular weakness    PE:    BP (!) 163/74   Pulse 133   Temp 96.8 °F (36 °C)   Resp 18   Ht 5' 5\" (1.651 m)   Wt 268 lb 8.3 oz (121.8 kg)   SpO2 91%   BMI 44.68 kg/m²   General Appearance:  awake, alert, oriented, in no acute distress  Skin:  Scattered excoriations, skin dry, foot fungus  Head/face:  NCAT  Eyes:  No gross abnormalities.  and PERRL  Lungs:  Non labored, RA, equal chest rise, right chest HD cath without swelling or erythema  Heart:  Tachy, RR, no JVD  Abdomen:  Obese, S, NT, ND  Extremities: LE swelling, poor peripheral pulses, pale, skin color change to distal toes, fungal growth  Neuro: CNs grossly intact, no slurred speech      Data:    No results found for: [id number]  No results found for: HAV, HEPAIGM, HEPBIGM, HEPBCAB, HBEAG, HEPCAB  Results for orders placed or performed during the hospital encounter of 07/07/18   Culture Blood #1   Result Value Ref Range    Blood Culture, Routine (A)      24 Hours- no growth  Gram stain performed from blood culture bottle media  Gram positive cocci in clusters  Coagulase negative Staph by PNA fish     Culture Blood #2   Result Value Ref Range    Culture, Blood 2 24 Hours- no growth    Urine Culture   Result Value Ref Range    Urine Culture, Routine (A)      Growth present, evaluating for:  Mixed gram negative rods      Organism Gram negative mikayla (A)     Urine Culture, Routine       >100,000 CFU/ml  Identification and sensitivity to follow     Basic metabolic panel   Result Value Ref Range    Sodium 140 132 - 146 mmol/L    Potassium 4.6 3.5 - 5.0 mmol/L    Chloride 99 98 - 107 mmol/L    CO2 15 (L) 22 - 29 mmol/L    Anion Gap 26 (H) 7 - 16 mmol/L    Glucose 194 (H) 74 - 109 mg/dL     (H) 8 - 23 mg/dL    CREATININE 7.9 (H) 0.5 - 1.0 mg/dL    GFR Non-African American 5 >=60 mL/min/1.73    GFR African American 6     Calcium 7.6 (L) 8.6 - 10.2 mg/dL   CBC   Result Value Ref Range    WBC 6.2 4.5 - 11.5 E9/L    RBC 3.94 3.50 - 99 mg/dL    VLDL Cholesterol Calculated 25 mg/dL   Magnesium   Result Value Ref Range    Magnesium 2.8 (H) 1.6 - 2.6 mg/dL   Phosphorus   Result Value Ref Range    Phosphorus 8.6 (H) 2.5 - 4.5 mg/dL   TSH without Reflex   Result Value Ref Range    TSH 4.750 (H) 0.270 - 4.200 uIU/mL   Protime-INR   Result Value Ref Range    Protime 13.5 (H) 9.3 - 12.4 sec    INR 1.2    APTT   Result Value Ref Range    aPTT 31.9 24.5 - 35.1 sec   Hepatitis B surface antibody   Result Value Ref Range    Hep B S Ab Non-Reactive NON REACT   Comprehensive metabolic panel   Result Value Ref Range    Sodium 139 132 - 146 mmol/L    Potassium 5.4 (H) 3.5 - 5.0 mmol/L    Chloride 98 98 - 107 mmol/L    CO2 15 (L) 22 - 29 mmol/L    Anion Gap 26 (H) 7 - 16 mmol/L    Glucose 90 74 - 109 mg/dL     (H) 8 - 23 mg/dL    CREATININE 8.3 (HH) 0.5 - 1.0 mg/dL    GFR Non-African American 5 >=60 mL/min/1.73    GFR African American 6     Calcium 8.1 (L) 8.6 - 10.2 mg/dL    Total Protein 6.5 6.4 - 8.3 g/dL    Alb 3.0 (L) 3.5 - 5.2 g/dL    Total Bilirubin 0.3 0.0 - 1.2 mg/dL    Alkaline Phosphatase 77 35 - 104 U/L    ALT 8 0 - 32 U/L    AST 20 0 - 31 U/L   Basic Metabolic Panel   Result Value Ref Range    Sodium 139 132 - 146 mmol/L    Potassium 4.0 3.5 - 5.0 mmol/L    Chloride 100 98 - 107 mmol/L    CO2 17 (L) 22 - 29 mmol/L    Anion Gap 22 (H) 7 - 16 mmol/L    Glucose 191 (H) 74 - 109 mg/dL     (H) 8 - 23 mg/dL    CREATININE 8.2 (HH) 0.5 - 1.0 mg/dL    GFR Non-African American 5 >=60 mL/min/1.73    GFR African American 6     Calcium 7.5 (L) 8.6 - 10.2 mg/dL   Platelet Confirmation   Result Value Ref Range    Platelet Confirmation SEE BELOW    CBC   Result Value Ref Range    WBC 5.0 4.5 - 11.5 E9/L    RBC 3.63 3.50 - 5.50 E12/L    Hemoglobin 8.8 (L) 11.5 - 15.5 g/dL    Hematocrit 30.7 (L) 34.0 - 48.0 %    MCV 84.6 80.0 - 99.9 fL    MCH 24.2 (L) 26.0 - 35.0 pg    MCHC 28.7 (L) 32.0 - 34.5 %    RDW 17.6 (H) 11.5 - 15.0 fL    Platelets 359 (L) 175 - 450 E9/L    MPV 12.3 (H) 7.0 - 12.0 fL   CBC Auto Differential   Result Value Ref Range    WBC 4.5 4.5 - 11.5 E9/L    RBC 3.63 3.50 - 5.50 E12/L    Hemoglobin 8.8 (L) 11.5 - 15.5 g/dL    Hematocrit 30.1 (L) 34.0 - 48.0 %    MCV 82.9 80.0 - 99.9 fL    MCH 24.2 (L) 26.0 - 35.0 pg    MCHC 29.2 (L) 32.0 - 34.5 %    RDW 17.5 (H) 11.5 - 15.0 fL    Platelets 968 (L) 821 - 450 E9/L    MPV 12.4 (H) 7.0 - 12.0 fL    Neutrophils % 75.0 43.0 - 80.0 %    Lymphocytes % 11.0 (L) 20.0 - 42.0 %    Monocytes % 9.5 2.0 - 12.0 %    Eosinophils % 3.5 0.0 - 6.0 %    Basophils % 1.0 0.0 - 2.0 %    Neutrophils # 3.38 1.80 - 7.30 E9/L    Lymphocytes # 0.50 (L) 1.50 - 4.00 E9/L    Monocytes # 0.45 0.10 - 0.95 E9/L    Eosinophils # 0.16 0.05 - 0.50 E9/L    Basophils # 0.05 0.00 - 0.20 E9/L    Anisocytosis 1+     Polychromasia 1+     Poikilocytes 1+     [place] Cells 1+     Ovalocytes 1+     Tear Drop Cells 1+    Comprehensive Metabolic Panel   Result Value Ref Range    Sodium 141 132 - 146 mmol/L    Potassium 4.0 3.5 - 5.0 mmol/L    Chloride 99 98 - 107 mmol/L    CO2 19 (L) 22 - 29 mmol/L    Anion Gap 23 (H) 7 - 16 mmol/L    Glucose 120 (H) 74 - 109 mg/dL    BUN 90 (H) 8 - 23 mg/dL    CREATININE 6.4 (H) 0.5 - 1.0 mg/dL    GFR Non-African American 6 >=60 mL/min/1.73    GFR African American 8     Calcium 7.7 (L) 8.6 - 10.2 mg/dL    Total Protein 6.2 (L) 6.4 - 8.3 g/dL    Alb 3.1 (L) 3.5 - 5.2 g/dL    Total Bilirubin 0.3 0.0 - 1.2 mg/dL    Alkaline Phosphatase 79 35 - 104 U/L    ALT 11 0 - 32 U/L    AST 20 0 - 31 U/L   Magnesium   Result Value Ref Range    Magnesium 2.4 1.6 - 2.6 mg/dL   Phosphorus   Result Value Ref Range    Phosphorus 6.7 (H) 2.5 - 4.5 mg/dL   Cortisol   Result Value Ref Range    Cortisol 16.31 2.68 - 18.40 mcg/dL   POCT Glucose   Result Value Ref Range    Meter Glucose 180 (H) 70 - 110 mg/dL   POCT Glucose   Result Value Ref Range    Meter Glucose 146 (H) 70 - 110 mg/dL   POCT Glucose   Result Value Ref Range    Meter Glucose administration of lidocaine, I introduced a micropuncture needle into the right internal jugular vein using ultrasound guidance. Through the micropuncture needle, a microwire was advanced into the superior vena cava. Over the microwire, a microsheath was placed. I then anesthetized a tract along the right chest wall over a distance of 10 cm and using a blunt tunneling device, I tunneled the tunneled dialysis catheter to the nick overlying the right internal jugular vein. Through the microsheath within the right internal jugular vein, an Amplatz wire was introduced into the superior vena cava. Over the Amplatz wire, two dilators were used to dilate a tract into the superior vena cava. Over the Amplatz wire, a peel-away sheath was placed. The Amplatz wire was removed and the tunneled dialysis catheter was introduced through the peel-away sheath into the superior vena cava. The catheter tips were positioned at the SVC/right atrial junction. Fluoroscopic images confirm position of the tunneled dialysis catheter. I then closed the incision over the right internal jugular vein with 2-0 silk suture. 2-0 silk suture was also used to secure the distal end of the tunneled dialysis catheter to the skin. The patient tolerated the procedure well and there were no complications. Conscious sedation was not administered. 1. Successful placement of a 13.5 Swedish x 24 cm tunneled dialysis catheter via the right internal jugular vein using ultrasound and fluoroscopic guidance. Xr Chest Portable    Result Date: 7/8/2018  Location:100 Exam: XR CHEST PORTABLE Comparison: April 23, 2014 History:   Chest pain CHF Findings: A single frontal portable view of the chest demonstrates cardiomegaly with vascular congestion small effusions. CHF    Ir Ultrasound Guidance Vascular Access    Result Date: 7/9/2018  Location:200 Exam: IR ULTRASOUND GUIDANCE VASCULAR ACCESS HISTORY:  Acute renal failure.  Ultrasound evaluation of potential access was performed. After successfully identifying a patent right internal jugular vein, and following the administration of lidocaine, real-time ultrasound guidance was used to puncture the right internal jugular vein. A permanent recording was created for the patient's record. Ultrasound guidance was provided during placement of a tunneled dialysis catheter via the right internal jugular vein. Us Retroperitoneal Limited    Result Date: 7/8/2018  Reading location:  200 CLINICAL STATEMENT: Acute renal failure TECHNIQUE: The exam is suboptimal due to body habitus. High-resolution real-time sonography of the kidneys was performed. Color-flow was employed as well. COMPARISON: March 21, 2012. The right kidney measures 11.4 and the left kidney measures 10.7 cm in craniocaudad dimension. No evidence of hydronephrosis. No definite masses or calculi. A Wiseman catheter is in the urinary bladder. It is decompressed. 1. The study is suboptimal due to body habitus. 2. However, that said, there is no evidence of hydronephrosis or mass.     Microbiology:  Organism   Date Value Ref Range Status   07/08/2018 Gram negative mikayla (A)  Preliminary     No results found for: WNDABS  No results found for: BLOODCULT1  Culture, Blood 2   Date Value Ref Range Status   07/08/2018 24 Hours- no growth  Preliminary   04/20/2014 5 Days- no growth  Final       URINE CULTURE  Urine Culture, Routine   Date Value Ref Range Status   07/08/2018 (A)  Preliminary    Growth present, evaluating for:  Mixed gram negative rods     07/08/2018   Preliminary    >100,000 CFU/ml  Identification and sensitivity to follow     04/20/2014 >100,000 CFU/ml  Final   04/20/2014 >100,000 CFU/ml  Final           Impression/Plan:    Patient is a 75 y.o. female who presented with 1/2 positive blood culture gram positive cocci in clusters   UTI GNR  ALECIA on CKD on HD, s/p cath 7/9  Hx E coli and Klebsiella UTI       Rocephin day 3  Pending cultures  Repeat blood

## 2018-07-11 LAB
ANION GAP SERPL CALCULATED.3IONS-SCNC: 20 MMOL/L (ref 7–16)
BASOPHILS ABSOLUTE: 0.02 E9/L (ref 0–0.2)
BASOPHILS RELATIVE PERCENT: 0.4 % (ref 0–2)
BUN BLDV-MCNC: 59 MG/DL (ref 8–23)
C-REACTIVE PROTEIN: 5.2 MG/DL (ref 0–0.4)
CALCIUM SERPL-MCNC: 8.1 MG/DL (ref 8.6–10.2)
CHLORIDE BLD-SCNC: 98 MMOL/L (ref 98–107)
CO2: 22 MMOL/L (ref 22–29)
CREAT SERPL-MCNC: 4.7 MG/DL (ref 0.5–1)
EOSINOPHILS ABSOLUTE: 0.12 E9/L (ref 0.05–0.5)
EOSINOPHILS RELATIVE PERCENT: 2.4 % (ref 0–6)
GFR AFRICAN AMERICAN: 11
GFR NON-AFRICAN AMERICAN: 9 ML/MIN/1.73
GLUCOSE BLD-MCNC: 126 MG/DL (ref 74–109)
HAV IGM SER IA-ACNC: NORMAL
HCT VFR BLD CALC: 30.9 % (ref 34–48)
HEMOGLOBIN: 9.1 G/DL (ref 11.5–15.5)
HEPATITIS B CORE IGM ANTIBODY: NORMAL
HEPATITIS B SURFACE ANTIGEN INTERPRETATION: NORMAL
HEPATITIS C ANTIBODY INTERPRETATION: NORMAL
IMMATURE GRANULOCYTES #: 0.1 E9/L
IMMATURE GRANULOCYTES %: 2 % (ref 0–5)
LYMPHOCYTES ABSOLUTE: 0.67 E9/L (ref 1.5–4)
LYMPHOCYTES RELATIVE PERCENT: 13.3 % (ref 20–42)
MCH RBC QN AUTO: 24.2 PG (ref 26–35)
MCHC RBC AUTO-ENTMCNC: 29.4 % (ref 32–34.5)
MCV RBC AUTO: 82.2 FL (ref 80–99.9)
METER GLUCOSE: 114 MG/DL (ref 70–110)
METER GLUCOSE: 121 MG/DL (ref 70–110)
METER GLUCOSE: 146 MG/DL (ref 70–110)
METER GLUCOSE: 152 MG/DL (ref 70–110)
METER GLUCOSE: 179 MG/DL (ref 70–110)
MONOCYTES ABSOLUTE: 0.84 E9/L (ref 0.1–0.95)
MONOCYTES RELATIVE PERCENT: 16.7 % (ref 2–12)
NEUTROPHILS ABSOLUTE: 3.28 E9/L (ref 1.8–7.3)
NEUTROPHILS RELATIVE PERCENT: 65.2 % (ref 43–80)
ORGANISM: ABNORMAL
ORGANISM: ABNORMAL
PARATHYROID HORMONE INTACT: 244 PG/ML (ref 15–65)
PDW BLD-RTO: 17.8 FL (ref 11.5–15)
PLATELET # BLD: 111 E9/L (ref 130–450)
PMV BLD AUTO: 11.5 FL (ref 7–12)
POTASSIUM SERPL-SCNC: 3.8 MMOL/L (ref 3.5–5)
RBC # BLD: 3.76 E12/L (ref 3.5–5.5)
SEDIMENTATION RATE, ERYTHROCYTE: 11 MM/HR (ref 0–20)
SODIUM BLD-SCNC: 140 MMOL/L (ref 132–146)
URINE CULTURE, ROUTINE: ABNORMAL
WBC # BLD: 5 E9/L (ref 4.5–11.5)

## 2018-07-11 PROCEDURE — 97530 THERAPEUTIC ACTIVITIES: CPT

## 2018-07-11 PROCEDURE — 85651 RBC SED RATE NONAUTOMATED: CPT

## 2018-07-11 PROCEDURE — 1200000000 HC SEMI PRIVATE

## 2018-07-11 PROCEDURE — 85025 COMPLETE CBC W/AUTO DIFF WBC: CPT

## 2018-07-11 PROCEDURE — 90935 HEMODIALYSIS ONE EVALUATION: CPT | Performed by: INTERNAL MEDICINE

## 2018-07-11 PROCEDURE — 2580000003 HC RX 258: Performed by: INTERNAL MEDICINE

## 2018-07-11 PROCEDURE — 6360000002 HC RX W HCPCS: Performed by: INTERNAL MEDICINE

## 2018-07-11 PROCEDURE — 97110 THERAPEUTIC EXERCISES: CPT

## 2018-07-11 PROCEDURE — 80048 BASIC METABOLIC PNL TOTAL CA: CPT

## 2018-07-11 PROCEDURE — 86140 C-REACTIVE PROTEIN: CPT

## 2018-07-11 PROCEDURE — 97535 SELF CARE MNGMENT TRAINING: CPT

## 2018-07-11 PROCEDURE — 36415 COLL VENOUS BLD VENIPUNCTURE: CPT

## 2018-07-11 PROCEDURE — 82962 GLUCOSE BLOOD TEST: CPT

## 2018-07-11 PROCEDURE — 94640 AIRWAY INHALATION TREATMENT: CPT

## 2018-07-11 PROCEDURE — 6370000000 HC RX 637 (ALT 250 FOR IP): Performed by: INTERNAL MEDICINE

## 2018-07-11 PROCEDURE — 6370000000 HC RX 637 (ALT 250 FOR IP): Performed by: NURSE PRACTITIONER

## 2018-07-11 PROCEDURE — 83970 ASSAY OF PARATHORMONE: CPT

## 2018-07-11 PROCEDURE — 97116 GAIT TRAINING THERAPY: CPT

## 2018-07-11 RX ADMIN — Medication 10 ML: at 11:10

## 2018-07-11 RX ADMIN — CEFTRIAXONE 1 G: 1 INJECTION, POWDER, FOR SOLUTION INTRAMUSCULAR; INTRAVENOUS at 06:21

## 2018-07-11 RX ADMIN — ACETAMINOPHEN 650 MG: 325 TABLET, FILM COATED ORAL at 21:41

## 2018-07-11 RX ADMIN — INSULIN LISPRO 1 UNITS: 100 INJECTION, SOLUTION INTRAVENOUS; SUBCUTANEOUS at 12:28

## 2018-07-11 RX ADMIN — CALCIUM ACETATE 1334 MG: 667 CAPSULE ORAL at 10:02

## 2018-07-11 RX ADMIN — LEVOTHYROXINE SODIUM 100 MCG: 100 TABLET ORAL at 06:21

## 2018-07-11 RX ADMIN — IPRATROPIUM BROMIDE AND ALBUTEROL SULFATE 1 AMPULE: .5; 3 SOLUTION RESPIRATORY (INHALATION) at 13:30

## 2018-07-11 RX ADMIN — CALCIUM ACETATE 1334 MG: 667 CAPSULE ORAL at 12:52

## 2018-07-11 RX ADMIN — HEPARIN SODIUM 5000 UNITS: 5000 INJECTION, SOLUTION INTRAVENOUS; SUBCUTANEOUS at 15:05

## 2018-07-11 RX ADMIN — IPRATROPIUM BROMIDE AND ALBUTEROL SULFATE 1 AMPULE: .5; 3 SOLUTION RESPIRATORY (INHALATION) at 07:01

## 2018-07-11 RX ADMIN — TERBINAFINE HYDROCHLORIDE: 1 CREAM TOPICAL at 21:41

## 2018-07-11 RX ADMIN — MIDODRINE HYDROCHLORIDE 5 MG: 5 TABLET ORAL at 10:03

## 2018-07-11 RX ADMIN — IPRATROPIUM BROMIDE AND ALBUTEROL SULFATE 1 AMPULE: .5; 3 SOLUTION RESPIRATORY (INHALATION) at 10:08

## 2018-07-11 RX ADMIN — ASPIRIN 81 MG 81 MG: 81 TABLET ORAL at 10:03

## 2018-07-11 RX ADMIN — TERBINAFINE HYDROCHLORIDE: 1 CREAM TOPICAL at 11:10

## 2018-07-11 RX ADMIN — Medication 10 ML: at 22:22

## 2018-07-11 RX ADMIN — HEPARIN SODIUM 5000 UNITS: 5000 INJECTION, SOLUTION INTRAVENOUS; SUBCUTANEOUS at 21:41

## 2018-07-11 RX ADMIN — SODIUM CHLORIDE 125 MG: 9 INJECTION, SOLUTION INTRAVENOUS at 11:10

## 2018-07-11 ASSESSMENT — PAIN SCALES - GENERAL
PAINLEVEL_OUTOF10: 8
PAINLEVEL_OUTOF10: 10
PAINLEVEL_OUTOF10: 0
PAINLEVEL_OUTOF10: 0

## 2018-07-11 ASSESSMENT — PAIN DESCRIPTION - LOCATION: LOCATION: BUTTOCKS;OTHER (COMMENT)

## 2018-07-11 NOTE — PROGRESS NOTES
Physical Therapy  Daily Treatment Note  7/11/2018  [id number]                      Wendy Zarco   [id number]                              1945    Patient Active Problem List   Diagnosis    T2DM (type 2 diabetes mellitus) ([facility] [address]    [facility]    CKD (chronic kidney disease) stage 4, GFR 15-29 ml/min ([facility])    Hypertension, essential, benign    Hypothyroid    Acute on chronic systolic heart failure (HCC)    Abdominal pain    SIRS (systemic inflammatory response syndrome) ([facility] [address]    Lower urinary tract infectious disease    Elevated LFTs    CAD (coronary artery disease)    [facility]    Acute renal failure (HCC)    Systolic congestive heart failure ([facility])    General weakness    Abnormal EKG    Acute renal failure ([facility]F) [facility]       Recommendation for discharge: subacute  Equipment prescriptions needed: none  AM-PAC Basic Mobility    Key: total(1); a lot (2); a little (3): none (4)  How much help from another person is needed. ..  2  1. Turning from your back to your side while in a flat bed without using bed rails?    2  2. Moving from lying on your back to sitting on the side of a flat bed w/o using bed rails? 2  3. Moving to and from a bed to a chair or wheelchair?     2  4. Standing up from a chair using your arms?    1  5. To walk in a hospital room?    1  6. Climbing 3-5 steps with a railing? Raw score:  10/24    Precautions: falls, alarm, dialysis     S: Patient cleared by nursing for treatment. Patient is agreeable to treatment. Family present and supportive. Pain status: (measured on a visual analog scale with 0=no pain and 10=excruciating pain) 0/10.    O: Pt was instructed in and performed the following:   Bed Mobility- Supine to sit- Moderate assist x 2     Scooting- Minimal assist x 2   Sit to supine- Moderate assist x 2     Transfers-sit to stand- Moderate assist x 2     Gait: Pt ambulated 3 feet using

## 2018-07-11 NOTE — FLOWSHEET NOTE
07/11/18 1938   Vital Signs   BP (!) 132/45   Temp 98 °F (36.7 °C)   Pulse 64   Weight 278 lb 14.1 oz (126.5 kg)   Weight Method Actual   Percent Weight Change -1.09   Pain Assessment   Pain Assessment 0-10   Pain Level 0   Post-Hemodialysis Assessment   Rinseback Volume (ml) 300 ml   Total Liters Processed (l/min) 71.2 l/min   Duration of Treatment (minutes) 210 minutes   Hemodialysis Intake (ml) 200 ml   Hemodialysis Output (ml) 1702 ml   NET Removed (ml) 1202 ml   Tolerated Treatment Good   Patient Response to Treatment Tolerated well with gentle fluid removal   Bilateral Breath Sounds Diminished   Edema Generalized   Edema Generalized +1   Physician Notified?  No

## 2018-07-11 NOTE — CARE COORDINATION
Ss note:7/11/2018 4:05 PM sw notified by charge nurse to arrange outpt dialysis at [address] [facility], info faxed. Will need hep panel results and 3 rd dialysis flow sheet. [phone] tomorrow in attempt to obtain a chair time, that office is short staffed due to staff on vacation. Pt accepted at [facility], will need a PRECERT. SW to complete a Hens.  JI Varela

## 2018-07-11 NOTE — PROGRESS NOTES
[facility]  PROGRESS NOTE  Admit Date:  7/7/2018   Age:   75 y.o. PCP:   Dayanna Nj MD    CC:   Chief Complaint   Patient presents with    Fatigue     generalized weakness began 2 weeks ago         No complaints, tolerating diet, denies fever, diarrhea    ROS:  Constitutional:  denies fever , chills   Cardiovascular: denies chest pain or palpitation   Gastrointestinal: . Denies abdominal pain, diarrhea, no nausea or vomiting  Genitourinary:      Denies  dysuria or burning micturition  Musculoskeletal: no aches or pain   Allergic/Immunologic:  No rash or itching     Vitals:    07/11/18 0730 07/11/18 1015 07/11/18 1245 07/11/18 1430   BP: (!) 101/48  133/60 (!) 151/63   Pulse: 71 63  65   Resp: 18 17     Temp: 98 °F (36.7 °C) 98.2 °F (36.8 °C)     TempSrc: Oral Oral     SpO2: 94% 95%     Weight:       Height:         General Appearance:  awake, alert, oriented, in no acute distress  Skin:  Scattered excoriations, skin dry, foot fungus, ecchymosis at IV sites  Head/face:  NCAT  Eyes:  No gross abnormalities.  and PERRL  Lungs:  Non labored, RA, equal chest rise, right chest HD cath without swelling or erythema  Heart:  Tachy, RR, no JVD  Abdomen:  Obese, S, NT, ND  Extremities: LE swelling, poor peripheral pulses, pale, skin color change to distal toes, fungal growth  Neuro: CNs grossly intact, no slurred speech    I & O - 24hr:    Intake/Output Summary (Last 24 hours) at 07/11/18 1452  Last data filed at 07/11/18 1428   Gross per 24 hour   Intake             1600 ml   Output              725 ml   Net              875 ml     Labs:  Recent Labs      07/09/18   1523  07/10/18   0742  07/11/18   0734   WBC  5.0  4.5  5.0   HGB  8.8*  8.8*  9.1*   HCT  30.7*  30.1*  30.9*   MCV  84.6  82.9  82.2   PLT  123*  125*  111*     Recent Labs      07/09/18   0810  07/10/18   0742  07/11/18   0734   NA  139  141  140   K  5.4*  4.0  3.8   CL  98  99  98   CO2  15*  19*

## 2018-07-11 NOTE — PROGRESS NOTES
P Quality Flow/Interdisciplinary Rounds Progress Note        Quality Flow Rounds held on July 11, 2018    Disciplines Attending:  Bedside Nurse, ,  and Nursing Unit Leadership    Jimmy Turner was admitted on 7/7/2018 11:42 PM    Anticipated Discharge Date:  Expected Discharge Date: 07/10/18    Disposition:    Neal Score:  Neal Scale Score: 18    Readmission Risk              Risk of Unplanned Readmission:        20             Discussed patient goal for the day, patient clinical progression, and barriers to discharge.   The following Goal(s) of the Day/Commitment(s) have been identified:  +BC, temporary HD cite, PT/OT, will need precert      Rashard Pineda  July 11, 2018

## 2018-07-11 NOTE — PROGRESS NOTES
symmetrical, trachea midline, thyroid symmetric  LUNGS:  No increased work of breathing, diminished air exchange, clear to auscultation bilaterally, no crackles or wheezing  CARDIOVASCULAR:  Normal apical impulse, regular rate and rhythm, normal S1 and S2, no S3 or S4, and no murmur noted and no JVD, no carotid bruit, +1 pedal edema, good carotid upstroke bilaterally. CHEST: Right chest dialysis catheter present   ABDOMEN:  Soft, nontender  MUSCULOSKELETAL:  No clubbing no cyanosis. No redness, warmth, or swelling of the joints  NEUROLOGIC:  awake ,oriented x3  SKIN:  no bruising or bleeding, normal skin color, texture, turgor and no redness, warmth, or swelling      Cardiographics  I personally reviewed the telemetry monitor strips with the following interpretation: Sinus Rhythm     Echocardiogram: 4/21/2018  Summary   compared to previous, there has been interval improvement of MR, from   moderate on the previous study to mild on the current study. moderate LVH,   mild TR and mild PHTN noted on previous study is not evident on the   current study. Technically limited study. Mild asymmetric septal hypertrophy. Ejection fraction is visually estimated at 45%. Technically limited study, wall motion could not be evaluated. There is doppler evidence of stage II diastolic dysfunction. Mild mitral annular calcification. Mild mitral regurgitation is present. Cardiac Catheterization 3/8/2012   ANGIOGRAPHIC RESULTS:   1. Left main:  No angiographically significant stenosis. 2.    Left anterior descending:  Severe diffuse calcification. Diffuse      probable 30% to 40% stenosis throughout the proximal one-quarter of the      length of the vessel. Mild diffuse luminal irregularities. A.   D-1:  A 1-mm vessel. B.   D-2:  A 1-mm vessel. C.   D-3:  A 1-mm vessel. 3.    Circumflex:  Severe calcification throughout its length. Mid 100%      occlusion.          A.   OM-1:  No angiographically significant stenosis. B.   OM-2:  Proximal 100% occlusion with faint filling of the distal          two-thirds of the vessel from left-to-left collaterals from the 1st          obtuse marginal.      4.    Right coronary artery:  Diffuse mild luminal irregularities. Dominant      vessel. The PDA is a 1.5-mm vessel and has mild to moderate diffuse      disease throughout its length. The mid portion stenosis may be      approaching 70%. Imaging  IR FLUORO GUIDED CVA DEVICE PLACEMENT   Final Result   1. Successful placement of a 13.5 [place] x 24 cm tunneled dialysis   catheter via the right internal jugular vein using ultrasound and   fluoroscopic guidance. IR ULTRASOUND GUIDANCE VASCULAR ACCESS   Final Result   Ultrasound guidance was provided during placement of a   tunneled dialysis catheter via the right internal jugular vein. US RETROPERITONEAL LIMITED   Final Result   1. The study is suboptimal due to body habitus. 2. However, that said, there is no evidence of hydronephrosis or mass. XR CHEST PORTABLE   Final Result   CHF      CT LUMBAR SPINE WO CONTRAST   Final Result   1. No acute fracture or subluxation. 2. Spondylosis with facet ligamentum flavum hypertrophy with mild   central spinal stenosis at L3-4, moderate L4-5.   3. Bilateral pleural effusions, at least moderate in size on the   right. 4. Renal atrophy. 5. Mild ascites. 6. Splenic artery aneurysm. CT Head WO Contrast   Final Result   Age-appropriate atrophy, no acute infarct or hemorrhage. CT Cervical Spine WO Contrast   Final Result   1. No fracture or subluxation. 2. Severe spondylosis and facet arthropathy C5-6. Facet arthropathy   C6-7.       VL DUP LOWER EXTREMITY ARTERIES BILATERAL    (Results Pending)       Lab Review   Lab Results   Component Value Date     07/11/2018     07/10/2018     07/09/2018    K 3.8 07/11/2018    K 4.0 07/10/2018    K 5.4 07/09/2018    CO2 22 07/11/2018    CO2 19 07/10/2018    CO2 15 07/09/2018    BUN 59 07/11/2018    BUN 90 07/10/2018     07/09/2018    CREATININE 4.7 07/11/2018    CREATININE 6.4 07/10/2018    CREATININE 8.3 07/09/2018    GLUCOSE 126 07/11/2018    GLUCOSE 120 07/10/2018    GLUCOSE 90 07/09/2018    GLUCOSE 122 06/04/2012    GLUCOSE 67 05/21/2012    GLUCOSE 72 05/14/2012    CALCIUM 8.1 07/11/2018    CALCIUM 7.7 07/10/2018    CALCIUM 8.1 07/09/2018     Lab Results   Component Value Date    CKTOTAL 43 07/08/2018    CKTOTAL 74 04/21/2014    CKTOTAL 56 04/20/2014    CKMB 2.5 07/08/2018    CKMB 2.6 04/21/2014    CKMB 2.2 04/20/2014    TROPONINI 0.05 07/08/2018    TROPONINI <0.01 04/21/2014    TROPONINI <0.01 04/20/2014     Lab Results   Component Value Date    WBC 5.0 07/11/2018    WBC 4.5 07/10/2018    WBC 5.0 07/09/2018    HGB 9.1 07/11/2018    HGB 8.8 07/10/2018    HGB 8.8 07/09/2018    HCT 30.9 07/11/2018    HCT 30.1 07/10/2018    HCT 30.7 07/09/2018    MCV 82.2 07/11/2018    MCV 82.9 07/10/2018    MCV 84.6 07/09/2018     07/11/2018     07/10/2018     07/09/2018     Lab Results   Component Value Date    CHOL 83 07/09/2018    CHOL 133 04/22/2014    CHOL 135 08/16/2012    CHOL 113 05/05/2012    CHOL 87 03/22/2012    TRIG 126 07/09/2018    TRIG 246 04/22/2014    TRIG 187 08/16/2012    HDL 42 07/09/2018    HDL 29 04/22/2014    HDL 33.0 08/16/2012     I have personally reviewed the laboratory, cardiac diagnostic and radiographic testing as outlined above:    Assessment:       Patient Active Problem List    Diagnosis Date Noted    Abdominal pain 08/15/2012     Priority: High    Acute on chronic systolic heart failure ([facility] 05/04/2012     Priority: High    Hypertension, essential, benign 03/03/2012     Priority: Medium    Hypothyroid 03/03/2012     Priority: Medium    T2DM (type 2 diabetes mellitus) ([place] [id number] 03/02/2012     Priority: Medium    PAF (paroxysmal atrial fibrillation) ([place] [id number] 03/02/2012 edited the note above based on my findings during my history, exam, and decision making. CONSTITUTIONAL: awake, alert, cooperative  HEAD: normocepalic, without obvious abnormality, atraumatic  NECK: Supple, no JVD  LUNGS: Rhonchi  CARDIOVASCULAR: RRR. As above    NOTE: This report was transcribed using voice recognition software.  Every effort was made to ensure accuracy; however, inadvertent computerized transcription errors may be present

## 2018-07-12 ENCOUNTER — APPOINTMENT (OUTPATIENT)
Dept: ULTRASOUND IMAGING | Age: 73
DRG: 291 | End: 2018-07-12
Payer: COMMERCIAL

## 2018-07-12 LAB
ANION GAP SERPL CALCULATED.3IONS-SCNC: 13 MMOL/L (ref 7–16)
ANISOCYTOSIS: ABNORMAL
BASOPHILS ABSOLUTE: 0.07 E9/L (ref 0–0.2)
BASOPHILS RELATIVE PERCENT: 1.5 % (ref 0–2)
BUN BLDV-MCNC: 33 MG/DL (ref 8–23)
BURR CELLS: ABNORMAL
CALCIUM SERPL-MCNC: 8.6 MG/DL (ref 8.6–10.2)
CHLORIDE BLD-SCNC: 97 MMOL/L (ref 98–107)
CO2: 27 MMOL/L (ref 22–29)
CREAT SERPL-MCNC: 3.3 MG/DL (ref 0.5–1)
EOSINOPHILS ABSOLUTE: 0.22 E9/L (ref 0.05–0.5)
EOSINOPHILS RELATIVE PERCENT: 4.5 % (ref 0–6)
GFR AFRICAN AMERICAN: 17
GFR NON-AFRICAN AMERICAN: 14 ML/MIN/1.73
GLUCOSE BLD-MCNC: 142 MG/DL (ref 74–109)
HCT VFR BLD CALC: 30.9 % (ref 34–48)
HEMOGLOBIN: 9 G/DL (ref 11.5–15.5)
LYMPHOCYTES ABSOLUTE: 0.24 E9/L (ref 1.5–4)
LYMPHOCYTES RELATIVE PERCENT: 5 % (ref 20–42)
MCH RBC QN AUTO: 24.3 PG (ref 26–35)
MCHC RBC AUTO-ENTMCNC: 29.1 % (ref 32–34.5)
MCV RBC AUTO: 83.5 FL (ref 80–99.9)
METAMYELOCYTES RELATIVE PERCENT: 1 % (ref 0–1)
METER GLUCOSE: 138 MG/DL (ref 70–110)
METER GLUCOSE: 145 MG/DL (ref 70–110)
METER GLUCOSE: 154 MG/DL (ref 70–110)
METER GLUCOSE: 164 MG/DL (ref 70–110)
METER GLUCOSE: 185 MG/DL (ref 70–110)
MONOCYTES ABSOLUTE: 0.34 E9/L (ref 0.1–0.95)
MONOCYTES RELATIVE PERCENT: 7 % (ref 2–12)
MRSA CULTURE ONLY: NORMAL
NEUTROPHILS ABSOLUTE: 3.94 E9/L (ref 1.8–7.3)
NEUTROPHILS RELATIVE PERCENT: 80.9 % (ref 43–80)
NUCLEATED RED BLOOD CELLS: 1 /100 WBC
OVALOCYTES: ABNORMAL
PDW BLD-RTO: 17.7 FL (ref 11.5–15)
PLATELET # BLD: 99 E9/L (ref 130–450)
PLATELET CONFIRMATION: NORMAL
PMV BLD AUTO: 11.3 FL (ref 7–12)
POIKILOCYTES: ABNORMAL
POLYCHROMASIA: ABNORMAL
POTASSIUM SERPL-SCNC: 3.5 MMOL/L (ref 3.5–5)
RBC # BLD: 3.7 E12/L (ref 3.5–5.5)
SODIUM BLD-SCNC: 137 MMOL/L (ref 132–146)
WBC # BLD: 4.8 E9/L (ref 4.5–11.5)

## 2018-07-12 PROCEDURE — 90935 HEMODIALYSIS ONE EVALUATION: CPT

## 2018-07-12 PROCEDURE — 6360000002 HC RX W HCPCS: Performed by: INTERNAL MEDICINE

## 2018-07-12 PROCEDURE — 80048 BASIC METABOLIC PNL TOTAL CA: CPT

## 2018-07-12 PROCEDURE — 93925 LOWER EXTREMITY STUDY: CPT

## 2018-07-12 PROCEDURE — 1200000000 HC SEMI PRIVATE

## 2018-07-12 PROCEDURE — 85025 COMPLETE CBC W/AUTO DIFF WBC: CPT

## 2018-07-12 PROCEDURE — 6370000000 HC RX 637 (ALT 250 FOR IP): Performed by: INTERNAL MEDICINE

## 2018-07-12 PROCEDURE — 94640 AIRWAY INHALATION TREATMENT: CPT

## 2018-07-12 PROCEDURE — 2580000003 HC RX 258: Performed by: INTERNAL MEDICINE

## 2018-07-12 PROCEDURE — 97535 SELF CARE MNGMENT TRAINING: CPT

## 2018-07-12 PROCEDURE — 6370000000 HC RX 637 (ALT 250 FOR IP): Performed by: NURSE PRACTITIONER

## 2018-07-12 PROCEDURE — 36415 COLL VENOUS BLD VENIPUNCTURE: CPT

## 2018-07-12 PROCEDURE — 82962 GLUCOSE BLOOD TEST: CPT

## 2018-07-12 PROCEDURE — 97110 THERAPEUTIC EXERCISES: CPT

## 2018-07-12 PROCEDURE — 97116 GAIT TRAINING THERAPY: CPT

## 2018-07-12 PROCEDURE — 6370000000 HC RX 637 (ALT 250 FOR IP): Performed by: FAMILY MEDICINE

## 2018-07-12 RX ORDER — LEVOFLOXACIN 500 MG/1
250 TABLET, FILM COATED ORAL EVERY OTHER DAY
Status: DISCONTINUED | OUTPATIENT
Start: 2018-07-12 | End: 2018-07-13 | Stop reason: HOSPADM

## 2018-07-12 RX ORDER — LANOLIN ALCOHOL/MO/W.PET/CERES
325 CREAM (GRAM) TOPICAL 2 TIMES DAILY
Qty: 90 TABLET | Refills: 3 | Status: SHIPPED | OUTPATIENT
Start: 2018-07-12

## 2018-07-12 RX ORDER — IPRATROPIUM BROMIDE AND ALBUTEROL SULFATE 2.5; .5 MG/3ML; MG/3ML
3 SOLUTION RESPIRATORY (INHALATION) EVERY 4 HOURS PRN
Qty: 360 ML | DISCHARGE
Start: 2018-07-12

## 2018-07-12 RX ORDER — MIDODRINE HYDROCHLORIDE 5 MG/1
5 TABLET ORAL
Qty: 90 TABLET | Refills: 3 | Status: SHIPPED | OUTPATIENT
Start: 2018-07-12

## 2018-07-12 RX ADMIN — ASPIRIN 81 MG 81 MG: 81 TABLET ORAL at 08:37

## 2018-07-12 RX ADMIN — IPRATROPIUM BROMIDE AND ALBUTEROL SULFATE 1 AMPULE: .5; 3 SOLUTION RESPIRATORY (INHALATION) at 06:09

## 2018-07-12 RX ADMIN — SODIUM CHLORIDE 125 MG: 9 INJECTION, SOLUTION INTRAVENOUS at 10:16

## 2018-07-12 RX ADMIN — CALCIUM ACETATE 1334 MG: 667 CAPSULE ORAL at 12:53

## 2018-07-12 RX ADMIN — TERBINAFINE HYDROCHLORIDE: 1 CREAM TOPICAL at 08:40

## 2018-07-12 RX ADMIN — MIDODRINE HYDROCHLORIDE 5 MG: 5 TABLET ORAL at 17:36

## 2018-07-12 RX ADMIN — LEVOFLOXACIN 250 MG: 500 TABLET, FILM COATED ORAL at 12:53

## 2018-07-12 RX ADMIN — CEFTRIAXONE 1 G: 1 INJECTION, POWDER, FOR SOLUTION INTRAMUSCULAR; INTRAVENOUS at 06:16

## 2018-07-12 RX ADMIN — Medication 10 ML: at 20:27

## 2018-07-12 RX ADMIN — MIDODRINE HYDROCHLORIDE 5 MG: 5 TABLET ORAL at 08:36

## 2018-07-12 RX ADMIN — ACETAMINOPHEN 650 MG: 325 TABLET, FILM COATED ORAL at 19:20

## 2018-07-12 RX ADMIN — CALCIUM ACETATE 1334 MG: 667 CAPSULE ORAL at 17:36

## 2018-07-12 RX ADMIN — IPRATROPIUM BROMIDE AND ALBUTEROL SULFATE 1 AMPULE: .5; 3 SOLUTION RESPIRATORY (INHALATION) at 09:54

## 2018-07-12 RX ADMIN — IPRATROPIUM BROMIDE AND ALBUTEROL SULFATE 1 AMPULE: .5; 3 SOLUTION RESPIRATORY (INHALATION) at 13:24

## 2018-07-12 RX ADMIN — CALCITRIOL 0.25 MCG: 0.25 CAPSULE, LIQUID FILLED ORAL at 08:36

## 2018-07-12 RX ADMIN — MIDODRINE HYDROCHLORIDE 5 MG: 5 TABLET ORAL at 12:53

## 2018-07-12 RX ADMIN — CALCIUM ACETATE 1334 MG: 667 CAPSULE ORAL at 08:37

## 2018-07-12 RX ADMIN — Medication 10 ML: at 08:40

## 2018-07-12 RX ADMIN — ACETAMINOPHEN 650 MG: 325 TABLET, FILM COATED ORAL at 03:30

## 2018-07-12 RX ADMIN — TERBINAFINE HYDROCHLORIDE: 1 CREAM TOPICAL at 20:27

## 2018-07-12 RX ADMIN — LEVOTHYROXINE SODIUM 100 MCG: 100 TABLET ORAL at 06:16

## 2018-07-12 ASSESSMENT — PAIN DESCRIPTION - LOCATION
LOCATION: HEAD
LOCATION: HEAD

## 2018-07-12 ASSESSMENT — PAIN SCALES - GENERAL
PAINLEVEL_OUTOF10: 0
PAINLEVEL_OUTOF10: 0
PAINLEVEL_OUTOF10: 7
PAINLEVEL_OUTOF10: 6

## 2018-07-12 ASSESSMENT — PAIN DESCRIPTION - PAIN TYPE
TYPE: ACUTE PAIN
TYPE: ACUTE PAIN

## 2018-07-12 ASSESSMENT — PAIN DESCRIPTION - FREQUENCY
FREQUENCY: CONTINUOUS
FREQUENCY: INTERMITTENT

## 2018-07-12 ASSESSMENT — PAIN DESCRIPTION - ONSET: ONSET: PROGRESSIVE

## 2018-07-12 ASSESSMENT — PAIN DESCRIPTION - DESCRIPTORS
DESCRIPTORS: HEADACHE
DESCRIPTORS: ACHING;CONSTANT;DISCOMFORT

## 2018-07-12 ASSESSMENT — PAIN DESCRIPTION - PROGRESSION: CLINICAL_PROGRESSION: GRADUALLY WORSENING

## 2018-07-12 ASSESSMENT — PAIN DESCRIPTION - ORIENTATION: ORIENTATION: INNER

## 2018-07-12 NOTE — PROGRESS NOTES
symmetric  LUNGS:  No increased work of breathing, diminished air exchange, clear to auscultation bilaterally, no crackles or wheezing  CARDIOVASCULAR:  Normal apical impulse, regular rate and rhythm, normal S1 and S2, no S3 or S4, and no murmur noted and no JVD, no carotid bruit, +1 pedal edema, good carotid upstroke bilaterally. CHEST: Right chest dialysis catheter present   ABDOMEN:  Soft, nontender  MUSCULOSKELETAL:  No clubbing no cyanosis. No redness, warmth, or swelling of the joints  NEUROLOGIC:  Awake, alert,oriented x3  SKIN:  no bruising or bleeding, normal skin color, texture, turgor and no redness, warmth, or swelling      Cardiographics  I personally reviewed the telemetry monitor strips with the following interpretation: Sinus Rhythm     Echocardiogram: 4/21/2018  Summary   compared to previous, there has been interval improvement of MR, from   moderate on the previous study to mild on the current study. moderate LVH,   mild TR and mild PHTN noted on previous study is not evident on the   current study. Technically limited study. Mild asymmetric septal hypertrophy. Ejection fraction is visually estimated at 45%. Technically limited study, wall motion could not be evaluated. There is doppler evidence of stage II diastolic dysfunction. Mild mitral annular calcification. Mild mitral regurgitation is present. Cardiac Catheterization 3/8/2012   ANGIOGRAPHIC RESULTS:   1. Left main:  No angiographically significant stenosis. 2.    Left anterior descending:  Severe diffuse calcification. Diffuse      probable 30% to 40% stenosis throughout the proximal one-quarter of the      length of the vessel. Mild diffuse luminal irregularities. A.   D-1:  A 1-mm vessel. B.   D-2:  A 1-mm vessel. C.   D-3:  A 1-mm vessel. 3.    Circumflex:  Severe calcification throughout its length. Mid 100%      occlusion.          A.   OM-1:  No angiographically significant 4.0 07/10/2018    CO2 27 07/12/2018    CO2 22 07/11/2018    CO2 19 07/10/2018    BUN 33 07/12/2018    BUN 59 07/11/2018    BUN 90 07/10/2018    CREATININE 3.3 07/12/2018    CREATININE 4.7 07/11/2018    CREATININE 6.4 07/10/2018    GLUCOSE 142 07/12/2018    GLUCOSE 126 07/11/2018    GLUCOSE 120 07/10/2018    GLUCOSE 122 06/04/2012    GLUCOSE 67 05/21/2012    GLUCOSE 72 05/14/2012    CALCIUM 8.6 07/12/2018    CALCIUM 8.1 07/11/2018    CALCIUM 7.7 07/10/2018     Lab Results   Component Value Date    CKTOTAL 43 07/08/2018    CKTOTAL 74 04/21/2014    CKTOTAL 56 04/20/2014    CKMB 2.5 07/08/2018    CKMB 2.6 04/21/2014    CKMB 2.2 04/20/2014    TROPONINI 0.05 07/08/2018    TROPONINI <0.01 04/21/2014    TROPONINI <0.01 04/20/2014     Lab Results   Component Value Date    WBC 4.8 07/12/2018    WBC 5.0 07/11/2018    WBC 4.5 07/10/2018    HGB 9.0 07/12/2018    HGB 9.1 07/11/2018    HGB 8.8 07/10/2018    HCT 30.9 07/12/2018    HCT 30.9 07/11/2018    HCT 30.1 07/10/2018    MCV 83.5 07/12/2018    MCV 82.2 07/11/2018    MCV 82.9 07/10/2018    PLT 99 07/12/2018     07/11/2018     07/10/2018     Lab Results   Component Value Date    CHOL 83 07/09/2018    CHOL 133 04/22/2014    CHOL 135 08/16/2012    CHOL 113 05/05/2012    CHOL 87 03/22/2012    TRIG 126 07/09/2018    TRIG 246 04/22/2014    TRIG 187 08/16/2012    HDL 42 07/09/2018    HDL 29 04/22/2014    HDL 33.0 08/16/2012     I have personally reviewed the laboratory, cardiac diagnostic and radiographic testing as outlined above:    Assessment:       Patient Active Problem List    Diagnosis Date Noted    Abdominal pain 08/15/2012     Priority: High    Acute on chronic systolic heart failure ([address] 05/04/2012     Priority: High    Hypertension, essential, benign 03/03/2012     Priority: Medium    Hypothyroid 03/03/2012     Priority: Medium    T2DM (type 2 diabetes mellitus) ([facility] 03/02/2012     Priority: Medium    PAF (paroxysmal atrial fibrillation) ([place] [id number] 03/02/2012     Priority: Medium    CKD (chronic kidney disease) stage 4, GFR 15-29 ml/min ([facility]) 03/02/2012     Priority: Medium    Acute renal failure ([facility] 66/19/7364    Systolic congestive heart failure ([facility] 07/08/2018    General weakness 07/08/2018    Abnormal EKG 07/08/2018    Acute renal failure (ARF) ([facility] 07/08/2018    NSTEMI (non-ST elevated myocardial infarction) ([facility] 08/18/2012    SIRS (systemic inflammatory response syndrome) ([facility] 08/15/2012    Lower urinary tract infectious disease 08/15/2012    Elevated LFTs 08/15/2012    CAD (coronary artery disease) 08/15/2012     IMPRESSION:   1. Congestive Heart Failure: Combined. Decompensated, improving. Is now on dialysis and fluid balance is negative > 1.4 L.  2. Elevated Troponin: Likely secondary to co morbid conditions. 3. Hypotension: Improved. Will continue midodrine. 4. Hyperkalemia: Resolved. 5. Coronary Artery Disease: s/p cardiac catheterization 3/8/2012, see report. 6. Hyperlipidemia: on statin therapy   7. Chronic Kidney Disease: Will follow nephrology. 8. Anemia   9. Type II Diabetes   10. Hypothyroid: on synthroid replacement       Recommendations:   1. Will continue current treatment at this time   2. Intake and Output  3. Increase ambulation as tolerated   4. Further cardiac recommendations forthcoming pending patients clinical progression and diagnostic test findings     Discussed with Patient and family at bedside   Discussed with Dr. Juanis Marie     I have personally participated in a face-to-face history and physical exam on the date of service. Reviewed chart, vitals, labs and radiologic studies. I also participated in medical decision making with Nataly , PETR on the date of service and I agree with all of the pertinent clinical information, assessment and treatment plan. I have reviewed and edited the note above based on my findings during my history, exam, and decision making.    RRR  Scattered rhonchi  As

## 2018-07-12 NOTE — PROGRESS NOTES
Subjective:  Gisela Pierre was seen and examined at bedside today. The patient's questions were answered and tests were reviewed. There were no new problems reported overnight. Patient is tolerating current diet. Just returned from HD again today  Denies any new complaints  Repeat blood cx are pending    A complete review of systems and social history was completed on admission and remains unchanged unless otherwise noted    Scheduled Meds:   calcium acetate  1,334 mg Oral TID WC    ferric gluconate (FERRLECIT) IVPB  125 mg Intravenous Daily    darbepoetin jitendra-polysorbate  60 mcg Subcutaneous Weekly    terbinafine   Topical BID    midodrine  5 mg Oral TID WC    sodium chloride flush  10 mL Intravenous 2 times per day    aspirin  81 mg Oral Daily    calcitRIOL  0.25 mcg Oral Once per day on Sun Tue Thu Sat    insulin lispro  15 Units Subcutaneous TID WC    insulin glargine  25 Units Subcutaneous Nightly    levothyroxine  100 mcg Oral Daily    ipratropium-albuterol  1 ampule Inhalation Q4H WA    heparin (porcine)  5,000 Units Subcutaneous 3 times per day    insulin lispro  0-6 Units Subcutaneous TID WC    insulin lispro  0-3 Units Subcutaneous Nightly    cefTRIAXone (ROCEPHIN) IV  1 g Intravenous Q24H     Continuous Infusions:   dextrose       PRN Meds:sodium chloride flush, acetaminophen, ondansetron, glucose, dextrose, glucagon (rDNA), dextrose    Objective:  BP (!) 99/53 Comment: notified RN  Pulse 74   Temp 98.2 °F (36.8 °C) (Axillary)   Resp 17   Ht 5' 5\" (1.651 m)   Wt 278 lb 14.1 oz (126.5 kg)   SpO2 94%   BMI 46.41 kg/m²   In: 740 [P.O.:540]  Out: 2177    In: 740   Out: 2177 [Urine:475]     AAO x 3, currently in NAD  RRR, pos S1, S2  CTA bilaterally, no wheeze, rales or rhonchi  bowel sounds present, nontender, nondistended  No clubbing, cyanosis, pos edema  No neuro changes   No obvious rashes or lesions.     Recent Labs      07/09/18   1523  07/10/18   0742  07/11/18   0734   WBC  5.0 4.5  5.0   HGB  8.8*  8.8*  9.1*   PLT  123*  125*  111*     Recent Labs      07/09/18   0810  07/10/18   0742  07/11/18   0734   NA  139  141  140   K  5.4*  4.0  3.8   CL  98  99  98   CO2  15*  19*  22   BUN  134*  90*  59*   CREATININE  8.3*  6.4*  4.7*   GLUCOSE  90  120*  126*     Recent Labs      07/09/18   0810  07/10/18   0742   BILITOT  0.3  0.3   ALKPHOS  77  79   AST  20  20   ALT  8  11     Recent Labs      07/09/18   0810   INR  1.2     No results for input(s): CKTOTAL, CKMB, CKMBINDEX, TROPONINI in the last 72 hours. Ct Head Wo Contrast    Result Date: 7/8/2018  Location:100 Exam: CT HEAD WO CONTRAST Comparison: March 16, 2009 History:  Trauma from fall, pain Findings: A noncontrast spiral CT of the brain was performed. Coronal and sagittal reconstructions. Automated dose exposure control was utilized for this examination. Prominent ventricles and sulci with periventricular leukomalacia. No evidence for acute hemorrhage or infarction. No mass-effect midline shift or herniation. Age-appropriate atrophy, no acute infarct or hemorrhage. Ct Cervical Spine Wo Contrast    Result Date: 7/8/2018  Location:100 Exam: CT CERVICAL SPINE WO CONTRAST Comparison: None History:  Trauma from fall, pain Findings: Spiral CT of cervical spine without contrast. Coronal and sagittal reconstructions were obtained. No soft tissue swelling or hematoma identified. Cervical vertebrae maintain normal stature and alignment. No evidence of fracture or subluxation. There is no spinal or foraminal stenosis. Facets are normal alignment. C1-C2 articulation is normal. Spondylosis with severe spurring sclerosis C5-6. Severe facet arthropathy right C5-6 C6-7.     1. No fracture or subluxation. 2. Severe spondylosis and facet arthropathy C5-6. Facet arthropathy C6-7.     Ct Lumbar Spine Wo Contrast    Result Date: 7/8/2018  Reading location: 100 Exam: CT LUMBAR SPINE WO CONTRAST Comparison: None History: Injury from fall, pain TECHNIQUE: Spiral CT of the lumbar spine. Coronal and sagittal reconstructions were obtained. Automated dose exposure control was utilized for this examination. Findings: The lumbar vertebral bodies maintain normal stature and alignment. No evidence of fracture or subluxation. No lytic or blastic destructive changes. At L4-5 there is vacuum disc phenomenon with bulging disc which along with facet ligamentum flavum hypertrophy resulting in mild to moderate spinal stenosis. There is mild spinal stenosis at L3-4 as well secondary to the facet ligamentum flavum hypertrophy. Associated mild foraminal stenosis at both these levels. The other disc levels are without evidence of significant spinal stenosis or foraminal stenosis. Renal atrophy. Splenic artery aneurysm in the splenic hilum which measures 2 x 2.4 cm. There are bilateral pleural effusions. There is a small amount of ascites. 1. No acute fracture or subluxation. 2. Spondylosis with facet ligamentum flavum hypertrophy with mild central spinal stenosis at L3-4, moderate L4-5. 3. Bilateral pleural effusions, at least moderate in size on the right. 4. Renal atrophy. 5. Mild ascites. 6. Splenic artery aneurysm. Xr Chest Portable    Result Date: 7/8/2018  Location:100 Exam: XR CHEST PORTABLE Comparison: April 23, 2014 History:   Chest pain CHF Findings: A single frontal portable view of the chest demonstrates cardiomegaly with vascular congestion small effusions. CHF    Us Retroperitoneal Limited    Result Date: 7/8/2018  Reading location:  200 CLINICAL STATEMENT: Acute renal failure TECHNIQUE: The exam is suboptimal due to body habitus. High-resolution real-time sonography of the kidneys was performed. Color-flow was employed as well. COMPARISON: March 21, 2012. The right kidney measures 11.4 and the left kidney measures 10.7 cm in craniocaudad dimension. No evidence of hydronephrosis. No definite masses or calculi.  A Wiseman catheter is in the urinary

## 2018-07-12 NOTE — PLAN OF CARE
Problem: OXYGENATION/RESPIRATORY FUNCTION  Goal: Patient will maintain patent airway  Outcome: Met This Shift

## 2018-07-12 NOTE — PROGRESS NOTES
Date:2018  Patient Name: Sarbjit Vaca  MRN: [id number]  : 1945  ROOM #: [id number]   Recommendation:subacute   Equipment prescriptions needed: TBD  Problem list / Diagnosis:   Patient Active Problem List   Diagnosis    T2DM (type 2 diabetes mellitus) ([facility]    PAF (paroxysmal atrial fibrillation) ([facility]    CKD (chronic kidney disease) stage 4, GFR 15-29 ml/min ([facility]    Hypertension, essential, benign    Hypothyroid    Acute on chronic systolic heart failure (HCC)    Abdominal pain    SIRS [facility]systemic inflammatory response syndrome) [facility]    Lower urinary tract infectious disease    Elevated LFTs    CAD (coronary artery disease)    NSTEMI (non-ST elevated myocardial infarction) ([facility]    Acute renal failure (HCC)    Systolic congestive heart failure (HCC)    General weakness    Abnormal EKG    Acute renal failure (ARF) (HCC)     Past Medical History:   Past Medical History:   Diagnosis Date    Arthritis     CAD (coronary artery disease)     had heart attack    CHF (congestive heart failure) ([facility] 14    Echo 14 EF 03% stage II diastolic    CKD (chronic kidney disease) stage 3, GFR 30-59 ml/min 3/2/2012    Depression, acute     Diabetes mellitus ([facility]     History of cardiovascular stress test 2012    VIABILITY STUDY WITH LEXISCAN    History of cardiovascular stress test 2012    lexiscan nuclear stress    History of echocardiogram 2018    EF 32%    Hyperlipidemia     Hypertension     Hypothyroid 3/3/2012    Liver disease     Psychiatric problem     Systolic CHF, chronic ([facility]     Systolic heart failure ([facility] 2012    3/6/2012-stress revealed an LVEF of 32%, evidence of stage I diastolic dysfuction     Onset/Medical history: medical history reviewed  Past medical history: reviewed  Pt cleared by nursing for treatment.    [facility] Raw Score 16  Pain Scale: no pain   Precautions : Falls , HD  Tx performed:therapy exercises and stretches to prepare for

## 2018-07-12 NOTE — DISCHARGE SUMMARY
Internal Medicine  Discharge Summary    NAME: Armaan Castro  :    MRN:  [id number]  Rosey Armstrong MD  ADMITTED: 2018      DISCHARGED: 18    ADMITTING PHYSICIAN: No att. providers found    CONSULTANT(S):   IP CONSULT TO SOCIAL WORK  IP CONSULT TO CARDIOLOGY  IP CONSULT TO NEPHROLOGY  IP CONSULT TO INFECTIOUS DISEASES     ADMITTING DIAGNOSIS:   Acute renal failure (ARF) ([facility] [N17.9]  Acute renal failure (ARF) ([facility] [address] [N17.9]     DISCHARGE DIAGNOSES:   1. Acutely decompensated systolic congestive heart failure with marked volume overload  2. Acute renal failure with development of end-stage renal disease currently on dialysis  3. Essential hypertension  4. Hyperlipidemia  5. Hypothyroidism  6. Coronary artery disease    BRIEF HISTORY OF PRESENT ILLNESS:   Elba Christie presented through [facility] emergency department for the evaluation of generalized weakness and inability to complete activities of daily living. In the emergency department, she was found to be suffering from acutely decompensated congestive heart failure with worsening renal failure on top of chronic kidney disease stage III. The patient's son and daughter are both present during my examination and updated accordingly. The patient admits to decreased urinary output at home. She admits to drinking 10-1 glasses of iced tea on a daily basis and would expect to have more urinary output with that degree of intake. She has a history of congestive heart failure and pulse with Dr. Neeru Mendez. She also has a history of chronic kidney disease and follows with Dr. Angelia Collet. She feels mildly improved today. Complete workup is being undertaken.     LABS[de-identified]  Lab Results   Component Value Date    WBC 4.8 2018    HGB 9.0 (L) 2018    HCT 30.9 (L) 2018    PLT 99 (L) 2018     2018    K 3.5 2018    CL 97 (L) 2018    CREATININE 3.3 (H) 2018    BUN 33 (H) 2018    CO2 27 2018    GLUCOSE 142 (H) internal jugular vein, and following the administration of lidocaine, real-time ultrasound guidance was used to puncture the right internal jugular vein. A permanent recording was created for the patient's record. Ultrasound guidance was provided during placement of a tunneled dialysis catheter via the right internal jugular vein. Us Retroperitoneal Limited    Result Date: 7/8/2018  Reading location:  200 CLINICAL STATEMENT: Acute renal failure TECHNIQUE: The exam is suboptimal due to body habitus. High-resolution real-time sonography of the kidneys was performed. Color-flow was employed as well. COMPARISON: March 21, 2012. The right kidney measures 11.4 and the left kidney measures 10.7 cm in craniocaudad dimension. No evidence of hydronephrosis. No definite masses or calculi. A Wiseman catheter is in the urinary bladder. It is decompressed. 1. The study is suboptimal due to body habitus. 2. However, that said, there is no evidence of hydronephrosis or mass. HOSPITAL COURSE:   Shabbir Hoover did well throughout the hospital stay. She was evaluated by the nephrology team and it was ultimately determined she would benefit from dialysis. Dialysis catheter was placed and she underwent 3 dialysis treatments without complication. Her congestive heart failure was treated accordingly and cardiac medications were maximized. Her volume status improved and she became more active and ambulatory. It was determined she would benefit from skilled nursing facility placement and this was arranged accordingly. Infectious disease team provide consultation secondary to the underlying infections. Antibiotics were adjusted accordingly. The patient was deemed acceptable for discharge today.      BRIEF PHYSICAL EXAMINATION AND LABORATORIES ON DAY OF DISCHARGE:  VITALS:  /66   Pulse 64   Temp 98.1 °F (36.7 °C) (Oral)   Resp 18   Ht 5' 5\" (1.651 m)   Wt 280 lb 12.8 oz (127.4 kg)   SpO2 96%   BMI 46.73 kg/m²     HEENT: PERRLA. EOMI. Sclera clear. Buccal mucosa moist.    Neck:  Supple. Trachea midline. No thyromegaly. No JVD. No bruits. Heart:  Rhythm regular, rate controlled. No murmurs. Lungs:  Symmetrical. Clear to auscultation bilaterally. No wheezes. No rhonchi. No rales. Abdomen: Soft. Non-tender. Non-distended. Bowel sounds positive. No organomegaly or masses. No pain on palpation    Extremities:  Peripheral pulses present. No peripheral edema. No ulcers. Neurologic:  Alert x 3. No focal deficit. Cranial nerves grossly intact. Skin:  No petechia. No hemorrhage. No wounds. DISPOSITION:  The patient's condition is good. At this time the patient is without objective evidence of an acute process requiring continuing hospitalization or inpatient management. They are stable for discharge with outpatient follow-up. I have spoken with the patient and discussed the results of the current hospitalization, in addition to providing specific details for the plan of care and counseling regarding the diagnosis and prognosis. The plan has been discussed in detail and they are aware of the specific conditions for emergent return, as well as the importance of follow-up. Their questions are answered at this time and they are agreeable with the plan for discharge to nursing home    DISCHARGE MEDICATIONS:    Armin Mccord Medication Instructions PEY:[id number]    Printed on:07/12/18 1001   Medication Information                      aspirin 81 MG tablet  Take 81 mg by mouth daily. calcitRIOL (ROCALTROL) 0.25 MCG capsule  Take 1 capsule by mouth four times a week. ferrous sulfate (FE TABS) 325 (65 Fe) MG EC tablet  Take 1 tablet by mouth 2 times daily             insulin aspart (NOVOLOG) 100 UNIT/ML injection  Inject 15 Units into the skin 3 times daily (before meals). insulin glargine (LANTUS) 100 UNIT/ML injection  Inject 25 Units into the skin nightly.

## 2018-07-12 NOTE — PROGRESS NOTES
Physical Therapy  Daily Treatment Note  7/12/2018  [id number]                      Dina Gomez   [id number]                              1945    Patient Active Problem List   Diagnosis    T2DM (type 2 diabetes mellitus) ([facility] [facility]    [facility]ox[facility]mal [facility] fibrillation) [facility]    CKD (chronic kidney disease) stage 4, GFR 15-29 ml/min ([facility])    Hypertension, essential, benign    Hypothyroid    Acute on chronic systolic heart failure (HCC)    Abdominal pain    SIRS (systemic inflammatory response syndrome) ([facility] [address]    Lower urinary tract infectious disease    Elevated LFTs    CAD (coronary artery disease)    NSTEMI (non-ST elevated myocardial infarction) ([facility] [address]    Acute renal failure (HCC)    Systolic congestive heart failure (HCC)    General weakness    Abnormal EKG    [facility]       Recommendation for discharge: subacute  Equipment prescriptions needed: none  AM-PAC Basic Mobility    Key: total(1); a lot (2); a little (3): none (4)  How much help from another person is needed. ..  2  1. Turning from your back to your side while in a flat bed without using bed rails?    2  2. Moving from lying on your back to sitting on the side of a flat bed w/o using bed rails? 2  3. Moving to and from a bed to a chair or wheelchair?     2  4. Standing up from a chair using your arms?    1  5. To walk in a hospital room?    1  6. Climbing 3-5 steps with a railing? Raw score:  10/24    Precautions: falls, alarm, dialysis     S: Patient cleared by nursing for treatment. Patient is agreeable to treatment. Family present and supportive. Pt c/o a cough. Pain status: (measured on a visual analog scale with 0=no pain and 10=excruciating pain) 0/10.    O: Pt was instructed in and performed the following:   Bed Mobility- Supine to sit- Moderate assist x 2     Scooting- Moderate assist x 1    Sit to supine- NA   Transfers-sit to stand- Moderate/Minimal assist x 2 with bed elevated    Gait: Pt

## 2018-07-12 NOTE — PROGRESS NOTES
Temp src Pulse Resp SpO2 Weight   07/12/18 0745 130/66 98.1 °F (36.7 °C) Oral 64 18 96 % -   07/12/18 0600 - - - - - - 280 lb 12.8 oz (127.4 kg)   07/11/18 2329 (!) 92/49 - - - - 93 % -   07/11/18 1945 (!) 99/53 98.2 °F (36.8 °C) Axillary 74 17 94 % -   07/11/18 1938 (!) 132/45 98 °F (36.7 °C) - 64 - - 278 lb 14.1 oz (126.5 kg)   07/11/18 1900 121/60 - - 63 - - -   07/11/18 1845 (!) 133/58 - - 67 - - -   07/11/18 1835 (!) 161/67 - - 63 - - -   07/11/18 1819 (!) 150/74 - - 58 - - -   07/11/18 1804 (!) 161/64 - - 57 - - -   07/11/18 1745 (!) 149/79 - - 64 - - -   07/11/18 1730 (!) 141/84 - - 64 - - -   07/11/18 1702 120/84 - - 64 - - -   07/11/18 1646 (!) 90/42 - - 63 - - -   07/11/18 1637 88/69 - - 64 - - -   07/11/18 1600 (!) 125/26 - - 65 - - -   07/11/18 1531 (!) 119/57 - - 70 - - -   07/11/18 1515 (!) 138/101 98.3 °F (36.8 °C) - 64 18 - 281 lb 15.5 oz (127.9 kg)   07/11/18 1430 (!) 151/63 - - 65 - 94 % -   @      Intake/Output Summary (Last 24 hours) at 07/12/18 1308  Last data filed at 07/12/18 8248   Gross per 24 hour   Intake              500 ml   Output             2052 ml   Net            -1552 ml         Wt Readings from Last 3 Encounters:   07/12/18 280 lb 12.8 oz (127.4 kg)   05/08/14 259 lb 12.8 oz (117.8 kg)   04/25/14 255 lb 12.8 oz (116 kg)       Constitutional:  in no acute distress  Neck: supple   Cardiovascular: RRR  Respiratory:  Poor inspiratory effort b/l   Gastrointestinal:  Soft, nontender, nondistended, NABS obses   Ext: 2+ LE  Skin: dry,  Neuro: awake, alert, interactive      DATA:    Recent Labs      07/10/18   0742  07/11/18   0734  07/12/18   0717   WBC  4.5  5.0  4.8   HGB  8.8*  9.1*  9.0*   HCT  30.1*  30.9*  30.9*   MCV  82.9  82.2  83.5   PLT  125*  111*  99*     Recent Labs      07/10/18   0742  07/11/18   0734  07/12/18   0717   NA  141  140  137   K  4.0  3.8  3.5   CL  99  98  97*   CO2  19*  22  27   MG  2.4   --    --    PHOS  6.7*   --    --    BUN  90*  59*  33*   CREATININE 6. 4*  4.7*  3.3*   ALT  11   --    --    AST  20   --    --    BILITOT  0.3   --    --    ALKPHOS  79   --    --        No results found for: LABPROT    ASSESSMENT / RECOMMENDATIONS:   ALECIA/ADVANCED CKD    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 IDDM   MBD :  Calcitriol , phsolo       D/w angela ZULETA HD for admission for dialysis .                  Electronically signed by Dora Tovar MD on 7/12/2018 at 1:08 PM

## 2018-07-12 NOTE — PROGRESS NOTES
15*  19*  22  27   BUN  134*  90*  59*  33*   CREATININE  8.3*  6.4*  4.7*  3.3*   GFRAA  6  8  11  17   LABGLOM  5  6  9  14   GLUCOSE  90  120*  126*  142*   PROT  6.5  6.2*   --    --    LABALBU  3.0*  3.1*   --    --    CALCIUM  8.1*  7.7*  8.1*  8.6   BILITOT  0.3  0.3   --    --    ALKPHOS  77  79   --    --    AST  20  20   --    --    ALT  8  11   --    --        MICROBIOLOGY:   BLOOD CX #1  Recent Labs      07/10/18   1400   BC  24 Hours- no growth       BLOOD CX #2  No results for input(s): BLOODCULT2 in the last 72 hours. CULTURE, RESPIRATORY   No results found for: CULTRESP    URINE CULTURE  Urine Culture, Routine   Date Value Ref Range Status   07/08/2018 >100,000 CFU/ml  Final   07/08/2018 >100,000 CFU/ml  Final   04/20/2014 >100,000 CFU/ml  Final   04/20/2014 >100,000 CFU/ml  Final     Organism   Date Value Ref Range Status   07/08/2018 Klebsiella pneumoniae ssp pneumoniae (A)  Final   07/08/2018 Escherichia coli (A)  Final   07/08/2018 Coagulase Negative Staphylococci (A)  Preliminary         Assessment/Plan:  Patient is a 75 y. o. female who presented with 1/2 positive blood culture gram positive cocci in clusters   UTI E coli/Klebsiella  ALECIA on CKD on HD, s/p cath 7/9  Hx E coli and Klebsiella UTI sensitive to rocephin      Rocephin day 5 - stop  Pending repeat blood cultures, 48 hours no growth thus far  Po levaquin q48hrs for 3 doses  Okay to d/c    Will discuss with attending  Rose Garcia     I have discussed the case, including pertinent history and physical  exam findings . I have seen and examined the patient and the key elements of the encounter have been performed by me. I agree with the assessment, plan and orders as documented.      Wade Fish MD, FACP  7/12/2018  5:33 PM

## 2018-07-13 VITALS
HEIGHT: 65 IN | BODY MASS INDEX: 43.82 KG/M2 | SYSTOLIC BLOOD PRESSURE: 103 MMHG | DIASTOLIC BLOOD PRESSURE: 66 MMHG | OXYGEN SATURATION: 95 % | HEART RATE: 67 BPM | TEMPERATURE: 97.6 F | WEIGHT: 263 LBS | RESPIRATION RATE: 18 BRPM

## 2018-07-13 LAB
ANION GAP SERPL CALCULATED.3IONS-SCNC: 12 MMOL/L (ref 7–16)
ANISOCYTOSIS: ABNORMAL
BASOPHILS ABSOLUTE: 0.06 E9/L (ref 0–0.2)
BASOPHILS RELATIVE PERCENT: 1 % (ref 0–2)
BLOOD CULTURE, ROUTINE: ABNORMAL
BLOOD CULTURE, ROUTINE: ABNORMAL
BUN BLDV-MCNC: 28 MG/DL (ref 8–23)
BURR CELLS: ABNORMAL
CALCIUM SERPL-MCNC: 8.9 MG/DL (ref 8.6–10.2)
CHLORIDE BLD-SCNC: 98 MMOL/L (ref 98–107)
CO2: 28 MMOL/L (ref 22–29)
CREAT SERPL-MCNC: 3 MG/DL (ref 0.5–1)
CULTURE, BLOOD 2: NORMAL
EOSINOPHILS ABSOLUTE: 0.48 E9/L (ref 0.05–0.5)
EOSINOPHILS RELATIVE PERCENT: 8.6 % (ref 0–6)
GFR AFRICAN AMERICAN: 18
GFR NON-AFRICAN AMERICAN: 15 ML/MIN/1.73
GLUCOSE BLD-MCNC: 153 MG/DL (ref 74–109)
HCT VFR BLD CALC: 32.6 % (ref 34–48)
HEMOGLOBIN: 9.4 G/DL (ref 11.5–15.5)
LYMPHOCYTES ABSOLUTE: 0.56 E9/L (ref 1.5–4)
LYMPHOCYTES RELATIVE PERCENT: 9.6 % (ref 20–42)
MCH RBC QN AUTO: 24.6 PG (ref 26–35)
MCHC RBC AUTO-ENTMCNC: 28.8 % (ref 32–34.5)
MCV RBC AUTO: 85.3 FL (ref 80–99.9)
METAMYELOCYTES RELATIVE PERCENT: 0.5 % (ref 0–1)
METER GLUCOSE: 148 MG/DL (ref 70–110)
METER GLUCOSE: 148 MG/DL (ref 70–110)
MONOCYTES ABSOLUTE: 0.45 E9/L (ref 0.1–0.95)
MONOCYTES RELATIVE PERCENT: 7.6 % (ref 2–12)
NEUTROPHILS ABSOLUTE: 4.09 E9/L (ref 1.8–7.3)
NEUTROPHILS RELATIVE PERCENT: 72.7 % (ref 43–80)
NUCLEATED RED BLOOD CELLS: 0.5 /100 WBC
ORGANISM: ABNORMAL
OVALOCYTES: ABNORMAL
PDW BLD-RTO: 17.6 FL (ref 11.5–15)
PLATELET # BLD: 102 E9/L (ref 130–450)
PMV BLD AUTO: 12.2 FL (ref 7–12)
POIKILOCYTES: ABNORMAL
POLYCHROMASIA: ABNORMAL
POTASSIUM SERPL-SCNC: 3.7 MMOL/L (ref 3.5–5)
RBC # BLD: 3.82 E12/L (ref 3.5–5.5)
SODIUM BLD-SCNC: 138 MMOL/L (ref 132–146)
TEAR DROP CELLS: ABNORMAL
WBC # BLD: 5.6 E9/L (ref 4.5–11.5)

## 2018-07-13 PROCEDURE — 6360000002 HC RX W HCPCS: Performed by: INTERNAL MEDICINE

## 2018-07-13 PROCEDURE — 2580000003 HC RX 258: Performed by: INTERNAL MEDICINE

## 2018-07-13 PROCEDURE — 97535 SELF CARE MNGMENT TRAINING: CPT

## 2018-07-13 PROCEDURE — 82962 GLUCOSE BLOOD TEST: CPT

## 2018-07-13 PROCEDURE — 6370000000 HC RX 637 (ALT 250 FOR IP): Performed by: NURSE PRACTITIONER

## 2018-07-13 PROCEDURE — 6370000000 HC RX 637 (ALT 250 FOR IP): Performed by: INTERNAL MEDICINE

## 2018-07-13 PROCEDURE — 97530 THERAPEUTIC ACTIVITIES: CPT

## 2018-07-13 PROCEDURE — 94640 AIRWAY INHALATION TREATMENT: CPT

## 2018-07-13 PROCEDURE — 36415 COLL VENOUS BLD VENIPUNCTURE: CPT

## 2018-07-13 PROCEDURE — 85025 COMPLETE CBC W/AUTO DIFF WBC: CPT

## 2018-07-13 PROCEDURE — 97116 GAIT TRAINING THERAPY: CPT

## 2018-07-13 PROCEDURE — 80048 BASIC METABOLIC PNL TOTAL CA: CPT

## 2018-07-13 RX ORDER — LEVOFLOXACIN 250 MG/1
250 TABLET ORAL EVERY OTHER DAY
Qty: 3 TABLET | Refills: 0 | Status: SHIPPED | OUTPATIENT
Start: 2018-07-14 | End: 2018-07-17

## 2018-07-13 RX ADMIN — Medication 10 ML: at 08:56

## 2018-07-13 RX ADMIN — MIDODRINE HYDROCHLORIDE 5 MG: 5 TABLET ORAL at 12:54

## 2018-07-13 RX ADMIN — CALCIUM ACETATE 1334 MG: 667 CAPSULE ORAL at 12:54

## 2018-07-13 RX ADMIN — CALCIUM ACETATE 1334 MG: 667 CAPSULE ORAL at 08:51

## 2018-07-13 RX ADMIN — MIDODRINE HYDROCHLORIDE 5 MG: 5 TABLET ORAL at 08:51

## 2018-07-13 RX ADMIN — ACETAMINOPHEN 650 MG: 325 TABLET, FILM COATED ORAL at 02:49

## 2018-07-13 RX ADMIN — IPRATROPIUM BROMIDE AND ALBUTEROL SULFATE 1 AMPULE: .5; 3 SOLUTION RESPIRATORY (INHALATION) at 06:58

## 2018-07-13 RX ADMIN — SODIUM CHLORIDE 125 MG: 9 INJECTION, SOLUTION INTRAVENOUS at 11:49

## 2018-07-13 RX ADMIN — LEVOTHYROXINE SODIUM 100 MCG: 100 TABLET ORAL at 05:59

## 2018-07-13 RX ADMIN — ASPIRIN 81 MG 81 MG: 81 TABLET ORAL at 08:51

## 2018-07-13 RX ADMIN — IPRATROPIUM BROMIDE AND ALBUTEROL SULFATE 1 AMPULE: .5; 3 SOLUTION RESPIRATORY (INHALATION) at 09:58

## 2018-07-13 RX ADMIN — IPRATROPIUM BROMIDE AND ALBUTEROL SULFATE 1 AMPULE: .5; 3 SOLUTION RESPIRATORY (INHALATION) at 14:15

## 2018-07-13 RX ADMIN — TERBINAFINE HYDROCHLORIDE: 1 CREAM TOPICAL at 08:51

## 2018-07-13 ASSESSMENT — PAIN SCALES - GENERAL
PAINLEVEL_OUTOF10: 0
PAINLEVEL_OUTOF10: 5

## 2018-07-13 NOTE — PROGRESS NOTES
P Quality Flow/Interdisciplinary Rounds Progress Note        Quality Flow Rounds held on July 13, 2018    Disciplines Attending:  Bedside Nurse, ,  and Nursing Unit Leadership    Alexa Brown was admitted on 7/7/2018 11:42 PM    Anticipated Discharge Date:  Expected Discharge Date: 07/12/18    Disposition:    Neal Score:  Neal Scale Score: 20    Readmission Risk              Risk of Unplanned Readmission:        15             Discussed patient goal for the day, patient clinical progression, and barriers to discharge.   The following Goal(s) of the Day/Commitment(s) have been identified:  DC, awaiting pre-cert SOv Orly Bachelor  July 13, 2018

## 2018-07-13 NOTE — PROGRESS NOTES
Occupational Therapy  O.T. Bedside Treatment Note    Date:2018  Patient Name: Jasen Benavides  MRN: [id number]  : 1945  ROOM #: [id number]    Problem list / Diagnosis:   Patient Active Problem List   Diagnosis    [facility]2DM (type 2 diabetes mellitus) ([facility] [address]    PAF (paroxysmal atrial fibrillation) (HCC)    CKD (chronic kidney disease) stage 4, GFR 15-29 ml/min (HCC)    Hypertension, essential, benign    Hypothyroid    Acute on chronic systolic heart failure (HCC)    Abdominal pain    SIRS (systemic inflammatory response syndrome) ([facility] [address]    Lower urinary tract infectious disease    Elevated LFTs    CAD (coronary artery disease)    NSTEMI (non-ST elevated myocardial infarction) ([facility] [address]    Acute renal failure (HCC)    Systolic congestive heart failure (HCC)    General weakness    Abnormal EKG    Acute renal failure (ARF) ([facility] [address]     Past Medical History:   Past Medical History:   Diagnosis Date    Arthritis     CAD (coronary artery disease)     had heart attack    CHF (congestive heart failure) ([facility] [address] 14    Echo 14 EF 71% stage II diastolic    CKD (chronic kidney disease) stage 3, GFR 30-59 ml/min 3/2/2012    Depression, acute     Diabetes mellitus ([facility] [address]     History of cardiovascular stress test 2012    VIABILITY STUDY WITH LEXISCAN    History of cardiovascular stress test 2012    lexiscan nuclear stress    History of echocardiogram 2018    EF 32%    Hyperlipidemia     Hypertension     Hypothyroid 3/3/2012    Liver disease     Psychiatric problem     Systolic CHF, chronic ([facility]     Systolic heart failure ([address] 2012    3/6/2012-stress revealed an LVEF of 32%, evidence of stage I diastolic dysfuction     Onset/Medical history: medical history reviewed  Past medical history: reviewed    The admitting diagnosis and active problem list as listed above have been reviewed prior to treatment. Nursing cleared the patient for treatment.  Patient is agreeable to treatment. Discharge recommendations: JANAE  Equipment prescriptions needed: TBD at rehab    AM [facility] Daily Activity Inpatient     AM-PAC Daily Activity Inpatient   How much help for putting on and taking off regular lower body clothing?: Total  How much help for Bathing?: A Lot  How much help for Toileting?: A Lot  How much help for putting on and taking off regular upper body clothing?: A Lot  How much help for taking care of personal grooming?: A Lot  How much help for eating meals?: None  AM[facility] Inpatient Daily Activity Raw Score: 13  AM-PAC Inpatient ADL T-Scale Score : 32.03  ADL Inpatient CMS 0-100% Score: 63.03  ADL Inpatient CMS G-Code Modifier : CL        Precautions: Falls, dizziness    S:  - Pt cleared for treatment through nursing.  - Pain: no c/o pain at this time . Pt c/o dizziness upon initial sitting at EOB  - 2 daughters present.    - Pt pleasant and cooperative during session. O:  Function Assessment     Status  Comment  Goal    Feeding:  Independent   To bring cup to mouth Independent    Grooming:  Maximal Assist  To brush hair while seated edge of bed. Independent    UE dressing:  Minimal/Mod Assist  To untie gown and don shirt Supervision    LE dressing:  Dependent  To don incontinence garment and  pants Supervision    Bed Mobility:     Max Assist x 2 for supine to sit; Max Assist x 2 for scooting; Moderate Assist x 2 for sit to supine Educated pt on safety, fall prevention and hand placement; assist to guide UB and LE's to/from EOB Minimal Assist     Functional Transfers:   Moderate Assist x 2 sit to stand from EOB x 3 reps to bariatric s/walker with height of bed slightly elevated Verbal cues for hand placement Minimal Assist    Functional mobility: 3 side steps towards head of bed with bariatric s/walker and  Min Assist x 2      Verbal cues for walker sequence and safety; pt required seated rest break between steps; pt fatigues quickly Modified [place]             A:  -Balance:

## 2018-07-13 NOTE — PROGRESS NOTES
diminished air exchange, clear to auscultation bilaterally, no crackles or wheezing  CARDIOVASCULAR:  Normal apical impulse, regular rate and rhythm, normal S1 and S2, no S3 or S4, and no murmur noted and no JVD, no carotid bruit, +1 pedal edema, good carotid upstroke bilaterally. CHEST: Right chest dialysis catheter present   ABDOMEN:  Soft, nontender  MUSCULOSKELETAL:  No clubbing no cyanosis. No redness, warmth, or swelling of the joints  NEUROLOGIC:  Awake, alert,oriented x3  SKIN:  no bruising or bleeding, normal skin color, texture, turgor and no redness, warmth, or swelling      Cardiographics  I personally reviewed the telemetry monitor strips with the following interpretation: Sinus Rhythm     Echocardiogram: 4/21/2018  Summary   compared to previous, there has been interval improvement of MR, from   moderate on the previous study to mild on the current study. moderate LVH,   mild TR and mild PHTN noted on previous study is not evident on the   current study. Technically limited study. Mild asymmetric septal hypertrophy. Ejection fraction is visually estimated at 45%. Technically limited study, wall motion could not be evaluated. There is doppler evidence of stage II diastolic dysfunction. Mild mitral annular calcification. Mild mitral regurgitation is present. Cardiac Catheterization 3/8/2012   ANGIOGRAPHIC RESULTS:   1. Left main:  No angiographically significant stenosis. 2.    Left anterior descending:  Severe diffuse calcification. Diffuse      probable 30% to 40% stenosis throughout the proximal one-quarter of the      length of the vessel. Mild diffuse luminal irregularities. A.   D-1:  A 1-mm vessel. B.   D-2:  A 1-mm vessel. C.   D-3:  A 1-mm vessel. 3.    Circumflex:  Severe calcification throughout its length. Mid 100%      occlusion. A.   OM-1:  No angiographically significant stenosis.          B.   OM-2:  Proximal 100% occlusion 03/02/2012     Priority: Medium    PAF (paroxysmal atrial fibrillation) ([facility] 03/02/2012     Priority: Medium    CKD (chronic kidney disease) stage 4, GFR 15-29 ml/min ([facility]) 03/02/2012     Priority: Medium    Acute renal failure ([facility] 68/41/9507    Systolic congestive heart failure ([facility] 07/08/2018    General weakness 07/08/2018    Abnormal EKG 07/08/2018    Acute renal failure (ARF) ([facility] 07/08/2018    NSTEMI (non-ST elevated myocardial infarction) ([facility] 08/18/2012    SIRS (systemic inflammatory response syndrome) ([facility] 08/15/2012    Lower urinary tract infectious disease 08/15/2012    Elevated LFTs 08/15/2012    CAD (coronary artery disease) 08/15/2012     IMPRESSION:   1. Congestive Heart Failure: Combined. Decompensated, Compensated. Will continue current treatment   2. Elevated Troponin: Likely secondary to co morbid conditions. Will continue current treatment at this time   3. Hypotension: Appears to be around baseline, Will continue current treatment. 4. Coronary Artery Disease: s/p cardiac catheterization 3/8/2012, see report. 5. Hyperlipidemia: on statin therapy   6. Chronic Kidney Disease: Will follow nephrology. 7. Anemia: Improving   8. Type II Diabetes   9. Hypothyroid: on synthroid replacement       Recommendations:   1. We will continue current treatment   2. Will monitor CBC and BMP   3. Intake and Output   4. Encouraged patient to increase ambulation as tolerated   5. Further cardiac recommendations forthcoming pending patients clinical progression    Discussed with Patient and family at bedside   Discussed with Dr. Kamala Parker     I have personally participated in a face-to-face history and physical exam on the date of service. Reviewed chart, vitals, labs and radiologic studies. I also participated in medical decision making with Jaci Do CNP on the date of service and I agree with all of the pertinent clinical information, assessment and treatment plan.  I have reviewed

## 2018-07-13 NOTE — PROGRESS NOTES
hypertension  4. Hyperlipidemia  5. Hypothyroidism  6. Coronary artery disease    Plan:   Melquiades Miller was originally scheduled for discharge yesterday but precertification was not obtained. We anticipate acquisition of precertification today. Discharge medications remain the same. She continues to tolerate dialysis. Continue current therapy. See orders for further plan of care.     Russell Kelley D.O.  11:04 AM  7/13/2018

## 2018-07-15 LAB — BLOOD CULTURE, ROUTINE: NORMAL

## 2018-07-16 LAB — CULTURE, BLOOD 2: NORMAL

## 2020-11-03 PROBLEM — N17.9 ACUTE RENAL FAILURE (HCC): Status: RESOLVED | Noted: 2018-07-08 | Resolved: 2020-11-03
